# Patient Record
Sex: MALE | Race: WHITE | NOT HISPANIC OR LATINO | Employment: OTHER | URBAN - METROPOLITAN AREA
[De-identification: names, ages, dates, MRNs, and addresses within clinical notes are randomized per-mention and may not be internally consistent; named-entity substitution may affect disease eponyms.]

---

## 2017-09-05 PROBLEM — R91.1 PULMONARY NODULE: Status: ACTIVE | Noted: 2017-09-05

## 2017-09-05 PROBLEM — M31.6 TEMPORAL ARTERITIS (HCC): Status: ACTIVE | Noted: 2017-09-05

## 2018-08-15 PROBLEM — N45.1 EPIDIDYMITIS: Status: ACTIVE | Noted: 2018-08-15

## 2018-09-05 PROBLEM — S22.050D CLOSED WEDGE COMPRESSION FRACTURE OF FIFTH THORACIC VERTEBRA WITH ROUTINE HEALING: Status: ACTIVE | Noted: 2018-09-05

## 2019-05-20 PROBLEM — J04.0 LARYNGITIS: Status: ACTIVE | Noted: 2019-05-20

## 2019-07-11 PROBLEM — E11.9 DIABETES MELLITUS WITHOUT COMPLICATION (HCC): Status: ACTIVE | Noted: 2019-07-11

## 2021-03-11 PROBLEM — M81.0 OP (OSTEOPOROSIS): Status: ACTIVE | Noted: 2021-03-11

## 2023-07-08 ENCOUNTER — HOSPITAL ENCOUNTER (INPATIENT)
Facility: MEDICAL CENTER | Age: 88
LOS: 2 days | DRG: 153 | End: 2023-07-10
Attending: EMERGENCY MEDICINE | Admitting: STUDENT IN AN ORGANIZED HEALTH CARE EDUCATION/TRAINING PROGRAM
Payer: MEDICARE

## 2023-07-08 DIAGNOSIS — J04.0 LARYNGITIS: ICD-10-CM

## 2023-07-08 DIAGNOSIS — J38.7 VALLECULAR MASS: ICD-10-CM

## 2023-07-08 DIAGNOSIS — R13.10 DYSPHAGIA, UNSPECIFIED TYPE: ICD-10-CM

## 2023-07-08 DIAGNOSIS — I48.91 ATRIAL FIBRILLATION WITH RAPID VENTRICULAR RESPONSE (HCC): ICD-10-CM

## 2023-07-08 LAB
ALBUMIN SERPL BCP-MCNC: 4.1 G/DL (ref 3.2–4.9)
ALBUMIN/GLOB SERPL: 1.4 G/DL
ALP SERPL-CCNC: 65 U/L (ref 30–99)
ALT SERPL-CCNC: 10 U/L (ref 2–50)
ANION GAP SERPL CALC-SCNC: 17 MMOL/L (ref 7–16)
AST SERPL-CCNC: 9 U/L (ref 12–45)
BASOPHILS # BLD AUTO: 0.2 % (ref 0–1.8)
BASOPHILS # BLD: 0.02 K/UL (ref 0–0.12)
BILIRUB SERPL-MCNC: 1.2 MG/DL (ref 0.1–1.5)
BUN SERPL-MCNC: 24 MG/DL (ref 8–22)
CALCIUM ALBUM COR SERPL-MCNC: 9 MG/DL (ref 8.5–10.5)
CALCIUM SERPL-MCNC: 9.1 MG/DL (ref 8.5–10.5)
CHLORIDE SERPL-SCNC: 101 MMOL/L (ref 96–112)
CO2 SERPL-SCNC: 20 MMOL/L (ref 20–33)
CREAT SERPL-MCNC: 0.88 MG/DL (ref 0.5–1.4)
EKG IMPRESSION: NORMAL
EOSINOPHIL # BLD AUTO: 0 K/UL (ref 0–0.51)
EOSINOPHIL NFR BLD: 0 % (ref 0–6.9)
ERYTHROCYTE [DISTWIDTH] IN BLOOD BY AUTOMATED COUNT: 46.4 FL (ref 35.9–50)
GFR SERPLBLD CREATININE-BSD FMLA CKD-EPI: 83 ML/MIN/1.73 M 2
GLOBULIN SER CALC-MCNC: 2.9 G/DL (ref 1.9–3.5)
GLUCOSE SERPL-MCNC: 140 MG/DL (ref 65–99)
HCT VFR BLD AUTO: 46.8 % (ref 42–52)
HGB BLD-MCNC: 14.9 G/DL (ref 14–18)
IMM GRANULOCYTES # BLD AUTO: 0.04 K/UL (ref 0–0.11)
IMM GRANULOCYTES NFR BLD AUTO: 0.5 % (ref 0–0.9)
LYMPHOCYTES # BLD AUTO: 0.84 K/UL (ref 1–4.8)
LYMPHOCYTES NFR BLD: 10.1 % (ref 22–41)
MCH RBC QN AUTO: 29 PG (ref 27–33)
MCHC RBC AUTO-ENTMCNC: 31.8 G/DL (ref 32.3–36.5)
MCV RBC AUTO: 91.1 FL (ref 81.4–97.8)
MONOCYTES # BLD AUTO: 0.09 K/UL (ref 0–0.85)
MONOCYTES NFR BLD AUTO: 1.1 % (ref 0–13.4)
NEUTROPHILS # BLD AUTO: 7.36 K/UL (ref 1.82–7.42)
NEUTROPHILS NFR BLD: 88.1 % (ref 44–72)
NRBC # BLD AUTO: 0 K/UL
NRBC BLD-RTO: 0 /100 WBC (ref 0–0.2)
PLATELET # BLD AUTO: 229 K/UL (ref 164–446)
PMV BLD AUTO: 9.5 FL (ref 9–12.9)
POTASSIUM SERPL-SCNC: 4.2 MMOL/L (ref 3.6–5.5)
PROT SERPL-MCNC: 7 G/DL (ref 6–8.2)
RBC # BLD AUTO: 5.14 M/UL (ref 4.7–6.1)
SODIUM SERPL-SCNC: 138 MMOL/L (ref 135–145)
TROPONIN T SERPL-MCNC: 12 NG/L (ref 6–19)
WBC # BLD AUTO: 8.4 K/UL (ref 4.8–10.8)

## 2023-07-08 PROCEDURE — 99222 1ST HOSP IP/OBS MODERATE 55: CPT | Mod: 25,AI | Performed by: STUDENT IN AN ORGANIZED HEALTH CARE EDUCATION/TRAINING PROGRAM

## 2023-07-08 PROCEDURE — 99285 EMERGENCY DEPT VISIT HI MDM: CPT

## 2023-07-08 PROCEDURE — 85025 COMPLETE CBC W/AUTO DIFF WBC: CPT

## 2023-07-08 PROCEDURE — 770020 HCHG ROOM/CARE - TELE (206)

## 2023-07-08 PROCEDURE — 96374 THER/PROPH/DIAG INJ IV PUSH: CPT

## 2023-07-08 PROCEDURE — 93005 ELECTROCARDIOGRAM TRACING: CPT | Performed by: EMERGENCY MEDICINE

## 2023-07-08 PROCEDURE — 700111 HCHG RX REV CODE 636 W/ 250 OVERRIDE (IP): Mod: JZ | Performed by: EMERGENCY MEDICINE

## 2023-07-08 PROCEDURE — 84484 ASSAY OF TROPONIN QUANT: CPT

## 2023-07-08 PROCEDURE — 94760 N-INVAS EAR/PLS OXIMETRY 1: CPT

## 2023-07-08 PROCEDURE — 99497 ADVNCD CARE PLAN 30 MIN: CPT | Performed by: STUDENT IN AN ORGANIZED HEALTH CARE EDUCATION/TRAINING PROGRAM

## 2023-07-08 PROCEDURE — 36415 COLL VENOUS BLD VENIPUNCTURE: CPT

## 2023-07-08 PROCEDURE — 80053 COMPREHEN METABOLIC PANEL: CPT

## 2023-07-08 PROCEDURE — 700105 HCHG RX REV CODE 258: Mod: JZ | Performed by: EMERGENCY MEDICINE

## 2023-07-08 RX ORDER — METOPROLOL TARTRATE 1 MG/ML
5 INJECTION, SOLUTION INTRAVENOUS
Status: DISCONTINUED | OUTPATIENT
Start: 2023-07-08 | End: 2023-07-10 | Stop reason: HOSPADM

## 2023-07-08 RX ORDER — ONDANSETRON 2 MG/ML
4 INJECTION INTRAMUSCULAR; INTRAVENOUS EVERY 4 HOURS PRN
Status: DISCONTINUED | OUTPATIENT
Start: 2023-07-08 | End: 2023-07-10 | Stop reason: HOSPADM

## 2023-07-08 RX ORDER — LABETALOL HYDROCHLORIDE 5 MG/ML
10 INJECTION, SOLUTION INTRAVENOUS EVERY 4 HOURS PRN
Status: DISCONTINUED | OUTPATIENT
Start: 2023-07-08 | End: 2023-07-10 | Stop reason: HOSPADM

## 2023-07-08 RX ORDER — POLYETHYLENE GLYCOL 3350 17 G/17G
1 POWDER, FOR SOLUTION ORAL
Status: DISCONTINUED | OUTPATIENT
Start: 2023-07-08 | End: 2023-07-10 | Stop reason: HOSPADM

## 2023-07-08 RX ORDER — ACETAMINOPHEN 325 MG/1
650 TABLET ORAL EVERY 6 HOURS PRN
Status: DISCONTINUED | OUTPATIENT
Start: 2023-07-08 | End: 2023-07-09

## 2023-07-08 RX ORDER — ATORVASTATIN CALCIUM 10 MG/1
10 TABLET, FILM COATED ORAL DAILY
Status: DISCONTINUED | OUTPATIENT
Start: 2023-07-08 | End: 2023-07-10 | Stop reason: HOSPADM

## 2023-07-08 RX ORDER — TRAMADOL HYDROCHLORIDE 50 MG/1
100 TABLET ORAL 2 TIMES DAILY
Status: DISCONTINUED | OUTPATIENT
Start: 2023-07-08 | End: 2023-07-09

## 2023-07-08 RX ORDER — BISACODYL 10 MG
10 SUPPOSITORY, RECTAL RECTAL
Status: DISCONTINUED | OUTPATIENT
Start: 2023-07-08 | End: 2023-07-10 | Stop reason: HOSPADM

## 2023-07-08 RX ORDER — DILTIAZEM HYDROCHLORIDE 5 MG/ML
10 INJECTION INTRAVENOUS ONCE
Status: COMPLETED | OUTPATIENT
Start: 2023-07-08 | End: 2023-07-08

## 2023-07-08 RX ORDER — AMOXICILLIN 250 MG
2 CAPSULE ORAL 2 TIMES DAILY
Status: DISCONTINUED | OUTPATIENT
Start: 2023-07-08 | End: 2023-07-10 | Stop reason: HOSPADM

## 2023-07-08 RX ORDER — ONDANSETRON 4 MG/1
4 TABLET, ORALLY DISINTEGRATING ORAL EVERY 4 HOURS PRN
Status: DISCONTINUED | OUTPATIENT
Start: 2023-07-08 | End: 2023-07-10 | Stop reason: HOSPADM

## 2023-07-08 RX ORDER — GUAIFENESIN/DEXTROMETHORPHAN 100-10MG/5
10 SYRUP ORAL EVERY 6 HOURS PRN
Status: DISCONTINUED | OUTPATIENT
Start: 2023-07-08 | End: 2023-07-10 | Stop reason: HOSPADM

## 2023-07-08 RX ORDER — TAMSULOSIN HYDROCHLORIDE 0.4 MG/1
0.4 CAPSULE ORAL DAILY
Status: DISCONTINUED | OUTPATIENT
Start: 2023-07-09 | End: 2023-07-10 | Stop reason: HOSPADM

## 2023-07-08 RX ORDER — SODIUM CHLORIDE 9 MG/ML
INJECTION, SOLUTION INTRAVENOUS CONTINUOUS
Status: DISCONTINUED | OUTPATIENT
Start: 2023-07-08 | End: 2023-07-10

## 2023-07-08 RX ORDER — MAGNESIUM SULFATE HEPTAHYDRATE 40 MG/ML
2 INJECTION, SOLUTION INTRAVENOUS ONCE
Status: COMPLETED | OUTPATIENT
Start: 2023-07-08 | End: 2023-07-09

## 2023-07-08 RX ORDER — SODIUM CHLORIDE, SODIUM LACTATE, POTASSIUM CHLORIDE, AND CALCIUM CHLORIDE .6; .31; .03; .02 G/100ML; G/100ML; G/100ML; G/100ML
500 INJECTION, SOLUTION INTRAVENOUS
Status: DISCONTINUED | OUTPATIENT
Start: 2023-07-08 | End: 2023-07-10 | Stop reason: HOSPADM

## 2023-07-08 RX ORDER — ASPIRIN 325 MG
325 TABLET ORAL EVERY 6 HOURS PRN
Status: ON HOLD | COMMUNITY
End: 2023-07-10

## 2023-07-08 RX ORDER — CELECOXIB 200 MG/1
200 CAPSULE ORAL DAILY
COMMUNITY
End: 2023-10-05

## 2023-07-08 RX ORDER — TRAMADOL HYDROCHLORIDE 50 MG/1
100 TABLET ORAL 2 TIMES DAILY
COMMUNITY
End: 2023-10-05 | Stop reason: SDUPTHER

## 2023-07-08 RX ORDER — SODIUM CHLORIDE, SODIUM LACTATE, POTASSIUM CHLORIDE, CALCIUM CHLORIDE 600; 310; 30; 20 MG/100ML; MG/100ML; MG/100ML; MG/100ML
1000 INJECTION, SOLUTION INTRAVENOUS ONCE
Status: COMPLETED | OUTPATIENT
Start: 2023-07-08 | End: 2023-07-08

## 2023-07-08 RX ORDER — TAMSULOSIN HYDROCHLORIDE 0.4 MG/1
0.4 CAPSULE ORAL DAILY
COMMUNITY
End: 2023-07-18

## 2023-07-08 RX ADMIN — DILTIAZEM HYDROCHLORIDE 10 MG: 5 INJECTION INTRAVENOUS at 21:08

## 2023-07-08 RX ADMIN — SODIUM CHLORIDE, POTASSIUM CHLORIDE, SODIUM LACTATE AND CALCIUM CHLORIDE 1000 ML: 600; 310; 30; 20 INJECTION, SOLUTION INTRAVENOUS at 21:13

## 2023-07-08 ASSESSMENT — CHA2DS2 SCORE
HYPERTENSION: YES
SEX: MALE
AGE 65 TO 74: NO
VASCULAR DISEASE: NO
PRIOR STROKE OR TIA OR THROMBOEMBOLISM: NO
CHA2DS2 VASC SCORE: 4
AGE 75 OR GREATER: YES
CHF OR LEFT VENTRICULAR DYSFUNCTION: NO
DIABETES: YES

## 2023-07-08 ASSESSMENT — FIBROSIS 4 INDEX: FIB4 SCORE: 1.45

## 2023-07-08 NOTE — ED TRIAGE NOTES
"Chief Complaint   Patient presents with    Other     Patient sent [facility] for sore throat and feeling like he has something stuck in throat. Patient voice is \"more gravel\" sounding over the day. The patient reports pain and feeling like something is stuck x 4 days. Patient was also dx with afib yesterday and started on medications but has been unable to take them due to unable to swallow.        "

## 2023-07-09 ENCOUNTER — HOSPITAL ENCOUNTER (OUTPATIENT)
Dept: RADIOLOGY | Facility: MEDICAL CENTER | Age: 88
End: 2023-07-09

## 2023-07-09 ENCOUNTER — APPOINTMENT (OUTPATIENT)
Dept: RADIOLOGY | Facility: MEDICAL CENTER | Age: 88
DRG: 153 | End: 2023-07-09
Attending: STUDENT IN AN ORGANIZED HEALTH CARE EDUCATION/TRAINING PROGRAM
Payer: MEDICARE

## 2023-07-09 PROBLEM — E86.0 DEHYDRATION: Status: ACTIVE | Noted: 2023-07-09

## 2023-07-09 PROBLEM — I48.91 ATRIAL FIBRILLATION WITH RVR (HCC): Status: ACTIVE | Noted: 2023-07-09

## 2023-07-09 PROBLEM — R73.9 HYPERGLYCEMIA: Status: ACTIVE | Noted: 2023-07-09

## 2023-07-09 LAB
ALBUMIN SERPL BCP-MCNC: 3.6 G/DL (ref 3.2–4.9)
ALBUMIN/GLOB SERPL: 1.2 G/DL
ALP SERPL-CCNC: 56 U/L (ref 30–99)
ALT SERPL-CCNC: 14 U/L (ref 2–50)
ANION GAP SERPL CALC-SCNC: 17 MMOL/L (ref 7–16)
APPEARANCE UR: CLEAR
AST SERPL-CCNC: 9 U/L (ref 12–45)
BASOPHILS # BLD AUTO: 0.2 % (ref 0–1.8)
BASOPHILS # BLD: 0.02 K/UL (ref 0–0.12)
BILIRUB SERPL-MCNC: 1.1 MG/DL (ref 0.1–1.5)
BILIRUB UR QL STRIP.AUTO: NEGATIVE
BUN SERPL-MCNC: 28 MG/DL (ref 8–22)
CALCIUM ALBUM COR SERPL-MCNC: 8.9 MG/DL (ref 8.5–10.5)
CALCIUM SERPL-MCNC: 8.6 MG/DL (ref 8.5–10.5)
CHLORIDE SERPL-SCNC: 104 MMOL/L (ref 96–112)
CO2 SERPL-SCNC: 20 MMOL/L (ref 20–33)
COLOR UR: YELLOW
CORTIS SERPL-MCNC: 2.6 UG/DL (ref 0–23)
CREAT SERPL-MCNC: 0.89 MG/DL (ref 0.5–1.4)
EKG IMPRESSION: NORMAL
EOSINOPHIL # BLD AUTO: 0 K/UL (ref 0–0.51)
EOSINOPHIL NFR BLD: 0 % (ref 0–6.9)
ERYTHROCYTE [DISTWIDTH] IN BLOOD BY AUTOMATED COUNT: 45 FL (ref 35.9–50)
EST. AVERAGE GLUCOSE BLD GHB EST-MCNC: 137 MG/DL
GFR SERPLBLD CREATININE-BSD FMLA CKD-EPI: 82 ML/MIN/1.73 M 2
GLOBULIN SER CALC-MCNC: 3 G/DL (ref 1.9–3.5)
GLUCOSE BLD STRIP.AUTO-MCNC: 153 MG/DL (ref 65–99)
GLUCOSE BLD STRIP.AUTO-MCNC: 163 MG/DL (ref 65–99)
GLUCOSE BLD STRIP.AUTO-MCNC: 177 MG/DL (ref 65–99)
GLUCOSE BLD STRIP.AUTO-MCNC: 209 MG/DL (ref 65–99)
GLUCOSE SERPL-MCNC: 136 MG/DL (ref 65–99)
GLUCOSE UR STRIP.AUTO-MCNC: NEGATIVE MG/DL
HBA1C MFR BLD: 6.4 % (ref 4–5.6)
HCT VFR BLD AUTO: 43.2 % (ref 42–52)
HGB BLD-MCNC: 14.3 G/DL (ref 14–18)
IMM GRANULOCYTES # BLD AUTO: 0.03 K/UL (ref 0–0.11)
IMM GRANULOCYTES NFR BLD AUTO: 0.3 % (ref 0–0.9)
KETONES UR STRIP.AUTO-MCNC: 15 MG/DL
LEUKOCYTE ESTERASE UR QL STRIP.AUTO: NEGATIVE
LYMPHOCYTES # BLD AUTO: 1.34 K/UL (ref 1–4.8)
LYMPHOCYTES NFR BLD: 15.4 % (ref 22–41)
MAGNESIUM SERPL-MCNC: 2.6 MG/DL (ref 1.5–2.5)
MCH RBC QN AUTO: 29.9 PG (ref 27–33)
MCHC RBC AUTO-ENTMCNC: 33.1 G/DL (ref 32.3–36.5)
MCV RBC AUTO: 90.2 FL (ref 81.4–97.8)
MICRO URNS: ABNORMAL
MONOCYTES # BLD AUTO: 0.16 K/UL (ref 0–0.85)
MONOCYTES NFR BLD AUTO: 1.8 % (ref 0–13.4)
NEUTROPHILS # BLD AUTO: 7.15 K/UL (ref 1.82–7.42)
NEUTROPHILS NFR BLD: 82.3 % (ref 44–72)
NITRITE UR QL STRIP.AUTO: NEGATIVE
NRBC # BLD AUTO: 0 K/UL
NRBC BLD-RTO: 0 /100 WBC (ref 0–0.2)
PH UR STRIP.AUTO: 5 [PH] (ref 5–8)
PHOSPHATE SERPL-MCNC: 4 MG/DL (ref 2.5–4.5)
PLATELET # BLD AUTO: 216 K/UL (ref 164–446)
PMV BLD AUTO: 9.6 FL (ref 9–12.9)
POTASSIUM SERPL-SCNC: 4.3 MMOL/L (ref 3.6–5.5)
PROCALCITONIN SERPL-MCNC: <0.05 NG/ML
PROT SERPL-MCNC: 6.6 G/DL (ref 6–8.2)
PROT UR QL STRIP: NEGATIVE MG/DL
RBC # BLD AUTO: 4.79 M/UL (ref 4.7–6.1)
RBC UR QL AUTO: NEGATIVE
SODIUM SERPL-SCNC: 141 MMOL/L (ref 135–145)
SP GR UR STRIP.AUTO: 1.04
T4 FREE SERPL-MCNC: 1.64 NG/DL (ref 0.93–1.7)
TROPONIN T SERPL-MCNC: 13 NG/L (ref 6–19)
TSH SERPL DL<=0.005 MIU/L-ACNC: 0.42 UIU/ML (ref 0.38–5.33)
UROBILINOGEN UR STRIP.AUTO-MCNC: 0.2 MG/DL
WBC # BLD AUTO: 8.7 K/UL (ref 4.8–10.8)

## 2023-07-09 PROCEDURE — 770020 HCHG ROOM/CARE - TELE (206)

## 2023-07-09 PROCEDURE — A9270 NON-COVERED ITEM OR SERVICE: HCPCS | Performed by: OTOLARYNGOLOGY

## 2023-07-09 PROCEDURE — 84145 PROCALCITONIN (PCT): CPT

## 2023-07-09 PROCEDURE — 81003 URINALYSIS AUTO W/O SCOPE: CPT

## 2023-07-09 PROCEDURE — 700102 HCHG RX REV CODE 250 W/ 637 OVERRIDE(OP)

## 2023-07-09 PROCEDURE — A9270 NON-COVERED ITEM OR SERVICE: HCPCS | Performed by: STUDENT IN AN ORGANIZED HEALTH CARE EDUCATION/TRAINING PROGRAM

## 2023-07-09 PROCEDURE — 36415 COLL VENOUS BLD VENIPUNCTURE: CPT

## 2023-07-09 PROCEDURE — 700111 HCHG RX REV CODE 636 W/ 250 OVERRIDE (IP): Mod: JZ | Performed by: STUDENT IN AN ORGANIZED HEALTH CARE EDUCATION/TRAINING PROGRAM

## 2023-07-09 PROCEDURE — 99232 SBSQ HOSP IP/OBS MODERATE 35: CPT | Performed by: STUDENT IN AN ORGANIZED HEALTH CARE EDUCATION/TRAINING PROGRAM

## 2023-07-09 PROCEDURE — 87086 URINE CULTURE/COLONY COUNT: CPT

## 2023-07-09 PROCEDURE — 83036 HEMOGLOBIN GLYCOSYLATED A1C: CPT

## 2023-07-09 PROCEDURE — 92611 MOTION FLUOROSCOPY/SWALLOW: CPT

## 2023-07-09 PROCEDURE — 84100 ASSAY OF PHOSPHORUS: CPT

## 2023-07-09 PROCEDURE — A9270 NON-COVERED ITEM OR SERVICE: HCPCS

## 2023-07-09 PROCEDURE — 700105 HCHG RX REV CODE 258: Performed by: STUDENT IN AN ORGANIZED HEALTH CARE EDUCATION/TRAINING PROGRAM

## 2023-07-09 PROCEDURE — 74230 X-RAY XM SWLNG FUNCJ C+: CPT

## 2023-07-09 PROCEDURE — 93010 ELECTROCARDIOGRAM REPORT: CPT | Performed by: STUDENT IN AN ORGANIZED HEALTH CARE EDUCATION/TRAINING PROGRAM

## 2023-07-09 PROCEDURE — 84443 ASSAY THYROID STIM HORMONE: CPT

## 2023-07-09 PROCEDURE — 84484 ASSAY OF TROPONIN QUANT: CPT

## 2023-07-09 PROCEDURE — 93005 ELECTROCARDIOGRAM TRACING: CPT | Performed by: STUDENT IN AN ORGANIZED HEALTH CARE EDUCATION/TRAINING PROGRAM

## 2023-07-09 PROCEDURE — 80053 COMPREHEN METABOLIC PANEL: CPT

## 2023-07-09 PROCEDURE — 700102 HCHG RX REV CODE 250 W/ 637 OVERRIDE(OP): Performed by: STUDENT IN AN ORGANIZED HEALTH CARE EDUCATION/TRAINING PROGRAM

## 2023-07-09 PROCEDURE — 84439 ASSAY OF FREE THYROXINE: CPT

## 2023-07-09 PROCEDURE — 83735 ASSAY OF MAGNESIUM: CPT

## 2023-07-09 PROCEDURE — 82962 GLUCOSE BLOOD TEST: CPT | Mod: 91

## 2023-07-09 PROCEDURE — 700102 HCHG RX REV CODE 250 W/ 637 OVERRIDE(OP): Performed by: OTOLARYNGOLOGY

## 2023-07-09 PROCEDURE — 0CJS8ZZ INSPECTION OF LARYNX, VIA NATURAL OR ARTIFICIAL OPENING ENDOSCOPIC: ICD-10-PCS | Performed by: OTOLARYNGOLOGY

## 2023-07-09 PROCEDURE — 700117 HCHG RX CONTRAST REV CODE 255: Performed by: STUDENT IN AN ORGANIZED HEALTH CARE EDUCATION/TRAINING PROGRAM

## 2023-07-09 PROCEDURE — 92610 EVALUATE SWALLOWING FUNCTION: CPT

## 2023-07-09 PROCEDURE — 82533 TOTAL CORTISOL: CPT

## 2023-07-09 PROCEDURE — 85025 COMPLETE CBC W/AUTO DIFF WBC: CPT

## 2023-07-09 RX ORDER — OXYMETAZOLINE HYDROCHLORIDE 0.05 G/100ML
2 SPRAY NASAL ONCE
Status: COMPLETED | OUTPATIENT
Start: 2023-07-09 | End: 2023-07-09

## 2023-07-09 RX ORDER — AMLODIPINE BESYLATE 10 MG/1
10 TABLET ORAL DAILY
Status: DISCONTINUED | OUTPATIENT
Start: 2023-07-09 | End: 2023-07-10 | Stop reason: HOSPADM

## 2023-07-09 RX ORDER — TRAMADOL HYDROCHLORIDE 50 MG/1
50 TABLET ORAL 2 TIMES DAILY
Status: DISCONTINUED | OUTPATIENT
Start: 2023-07-09 | End: 2023-07-10 | Stop reason: HOSPADM

## 2023-07-09 RX ORDER — ACETAMINOPHEN 500 MG
1000 TABLET ORAL 3 TIMES DAILY
Status: DISCONTINUED | OUTPATIENT
Start: 2023-07-09 | End: 2023-07-10 | Stop reason: HOSPADM

## 2023-07-09 RX ORDER — DEXAMETHASONE SODIUM PHOSPHATE 4 MG/ML
4 INJECTION, SOLUTION INTRA-ARTICULAR; INTRALESIONAL; INTRAMUSCULAR; INTRAVENOUS; SOFT TISSUE EVERY 8 HOURS
Status: DISCONTINUED | OUTPATIENT
Start: 2023-07-09 | End: 2023-07-10 | Stop reason: HOSPADM

## 2023-07-09 RX ORDER — KETOROLAC TROMETHAMINE 30 MG/ML
15 INJECTION, SOLUTION INTRAMUSCULAR; INTRAVENOUS EVERY 8 HOURS PRN
Status: DISCONTINUED | OUTPATIENT
Start: 2023-07-09 | End: 2023-07-10 | Stop reason: HOSPADM

## 2023-07-09 RX ORDER — LOSARTAN POTASSIUM 50 MG/1
100 TABLET ORAL DAILY
Status: DISCONTINUED | OUTPATIENT
Start: 2023-07-09 | End: 2023-07-10 | Stop reason: HOSPADM

## 2023-07-09 RX ORDER — LIDOCAINE HYDROCHLORIDE 20 MG/ML
15 SOLUTION OROPHARYNGEAL
Status: DISCONTINUED | OUTPATIENT
Start: 2023-07-09 | End: 2023-07-10 | Stop reason: HOSPADM

## 2023-07-09 RX ORDER — AMOXICILLIN AND CLAVULANATE POTASSIUM 875; 125 MG/1; MG/1
1 TABLET, FILM COATED ORAL EVERY 12 HOURS
Status: DISCONTINUED | OUTPATIENT
Start: 2023-07-09 | End: 2023-07-10 | Stop reason: HOSPADM

## 2023-07-09 RX ADMIN — AMOXICILLIN AND CLAVULANATE POTASSIUM 1 TABLET: 875; 125 TABLET, FILM COATED ORAL at 17:08

## 2023-07-09 RX ADMIN — ACETAMINOPHEN 1000 MG: 500 TABLET, FILM COATED ORAL at 17:07

## 2023-07-09 RX ADMIN — METOPROLOL TARTRATE 12.5 MG: 25 TABLET, FILM COATED ORAL at 17:08

## 2023-07-09 RX ADMIN — SODIUM CHLORIDE: 9 INJECTION, SOLUTION INTRAVENOUS at 01:04

## 2023-07-09 RX ADMIN — ACETAMINOPHEN 1000 MG: 500 TABLET, FILM COATED ORAL at 11:53

## 2023-07-09 RX ADMIN — TAMSULOSIN HYDROCHLORIDE 0.4 MG: 0.4 CAPSULE ORAL at 05:24

## 2023-07-09 RX ADMIN — INSULIN HUMAN 1 UNITS: 100 INJECTION, SOLUTION PARENTERAL at 19:52

## 2023-07-09 RX ADMIN — INSULIN HUMAN 1 UNITS: 100 INJECTION, SOLUTION PARENTERAL at 16:53

## 2023-07-09 RX ADMIN — MAGNESIUM SULFATE HEPTAHYDRATE 2 G: 2 INJECTION, SOLUTION INTRAVENOUS at 01:04

## 2023-07-09 RX ADMIN — INSULIN HUMAN 1 UNITS: 100 INJECTION, SOLUTION PARENTERAL at 07:38

## 2023-07-09 RX ADMIN — DEXAMETHASONE SODIUM PHOSPHATE 4 MG: 4 INJECTION, SOLUTION INTRAMUSCULAR; INTRAVENOUS at 01:33

## 2023-07-09 RX ADMIN — TRAMADOL HYDROCHLORIDE 50 MG: 50 TABLET ORAL at 17:09

## 2023-07-09 RX ADMIN — TRAMADOL HYDROCHLORIDE 50 MG: 50 TABLET ORAL at 06:24

## 2023-07-09 RX ADMIN — AMOXICILLIN AND CLAVULANATE POTASSIUM 1 TABLET: 875; 125 TABLET, FILM COATED ORAL at 11:53

## 2023-07-09 RX ADMIN — TRAMADOL HYDROCHLORIDE 50 MG: 50 TABLET, COATED ORAL at 00:14

## 2023-07-09 RX ADMIN — BARIUM SULFATE 700 MG: 700 TABLET ORAL at 10:55

## 2023-07-09 RX ADMIN — DEXAMETHASONE SODIUM PHOSPHATE 4 MG: 4 INJECTION, SOLUTION INTRAMUSCULAR; INTRAVENOUS at 10:32

## 2023-07-09 RX ADMIN — METOPROLOL TARTRATE 12.5 MG: 25 TABLET, FILM COATED ORAL at 00:13

## 2023-07-09 RX ADMIN — METOPROLOL TARTRATE 12.5 MG: 25 TABLET, FILM COATED ORAL at 05:24

## 2023-07-09 RX ADMIN — ATORVASTATIN CALCIUM 10 MG: 10 TABLET, FILM COATED ORAL at 17:08

## 2023-07-09 RX ADMIN — DEXAMETHASONE SODIUM PHOSPHATE 4 MG: 4 INJECTION, SOLUTION INTRAMUSCULAR; INTRAVENOUS at 17:05

## 2023-07-09 RX ADMIN — AMLODIPINE BESYLATE 10 MG: 10 TABLET ORAL at 17:09

## 2023-07-09 RX ADMIN — OXYMETAZOLINE HCL 2 SPRAY: 0.05 SPRAY NASAL at 09:45

## 2023-07-09 RX ADMIN — ATORVASTATIN CALCIUM 10 MG: 10 TABLET, FILM COATED ORAL at 01:08

## 2023-07-09 RX ADMIN — INSULIN HUMAN 2 UNITS: 100 INJECTION, SOLUTION PARENTERAL at 11:56

## 2023-07-09 RX ADMIN — APIXABAN 5 MG: 5 TABLET, FILM COATED ORAL at 17:08

## 2023-07-09 ASSESSMENT — PAIN DESCRIPTION - PAIN TYPE
TYPE: ACUTE PAIN

## 2023-07-09 ASSESSMENT — PATIENT HEALTH QUESTIONNAIRE - PHQ9
2. FEELING DOWN, DEPRESSED, IRRITABLE, OR HOPELESS: NOT AT ALL
SUM OF ALL RESPONSES TO PHQ9 QUESTIONS 1 AND 2: 0
1. LITTLE INTEREST OR PLEASURE IN DOING THINGS: NOT AT ALL

## 2023-07-09 ASSESSMENT — COGNITIVE AND FUNCTIONAL STATUS - GENERAL
SUGGESTED CMS G CODE MODIFIER DAILY ACTIVITY: CH
STANDING UP FROM CHAIR USING ARMS: A LITTLE
DAILY ACTIVITIY SCORE: 24

## 2023-07-09 ASSESSMENT — LIFESTYLE VARIABLES
TOTAL SCORE: 0
SUBSTANCE_ABUSE: 0
HAVE PEOPLE ANNOYED YOU BY CRITICIZING YOUR DRINKING: NO
EVER HAD A DRINK FIRST THING IN THE MORNING TO STEADY YOUR NERVES TO GET RID OF A HANGOVER: NO
HAVE YOU EVER FELT YOU SHOULD CUT DOWN ON YOUR DRINKING: NO
AVERAGE NUMBER OF DAYS PER WEEK YOU HAVE A DRINK CONTAINING ALCOHOL: 0
HOW MANY TIMES IN THE PAST YEAR HAVE YOU HAD 5 OR MORE DRINKS IN A DAY: 0
DOES PATIENT WANT TO STOP DRINKING: CANNOT ASSESS
ON A TYPICAL DAY WHEN YOU DRINK ALCOHOL HOW MANY DRINKS DO YOU HAVE: 0
ALCOHOL_USE: NO
CONSUMPTION TOTAL: NEGATIVE
EVER FELT BAD OR GUILTY ABOUT YOUR DRINKING: NO
TOTAL SCORE: 0
TOTAL SCORE: 0

## 2023-07-09 ASSESSMENT — ENCOUNTER SYMPTOMS
DIZZINESS: 0
FEVER: 0
DEPRESSION: 0
ROS GI COMMENTS: DYSPHAGIA
MYALGIAS: 0
BRUISES/BLEEDS EASILY: 0
PALPITATIONS: 1
ABDOMINAL PAIN: 0
HEARTBURN: 1
WEAKNESS: 1
NAUSEA: 1
HEADACHES: 0
SORE THROAT: 1
DOUBLE VISION: 0
CHILLS: 0
VOMITING: 0
COUGH: 0
BLURRED VISION: 0
SHORTNESS OF BREATH: 0

## 2023-07-09 ASSESSMENT — FIBROSIS 4 INDEX
FIB4 SCORE: 1.09
FIB4 SCORE: 1.09

## 2023-07-09 NOTE — THERAPY
"   Speech Language Pathology   Videofluoroscopic Swallow Study Evaluation     Patient Name: Abhilash Garcia  AGE:  88 y.o., SEX:  male  Medical Record #: [id number]  Date of Service: 7/9/2023      History of Present Illness  88 y.o. male who presents to the ED complaining of throat pain onset four days ago. He locates the pain to the right side of his throat.  PMHx: atrial fibrillation on Eliquis, ulcerative colitis, hypertension, hyperlipidemia      He had multiple CT chest and CT soft tissues neck --which did not show any obstruction or leak.  ENT was consulted by ERP, formal evaluation to follow in AM.      CT of neck 7/8/23:  There is opacification of the vallecula bilaterally, new compared to the prior exam.  This has the appearance of ingested material, it given the presence of mottled air.  Prominent palatine tonsils.  Right mastoid effusion.        Flexible fiberoptic laryngoscopy 7/9/23:  \"CT with inflammatory changes in palatine tonsils, lingual tonsils, epiglottis, uvula. Scope today confirms these findings with edema throughout. Airway widely patent. Some hoarseness and left vocal fold is sluggish vs right. No evidence of tumors or masses and no food impaction present.\"      Pertinent Information  Current Method of Nutrition: Oral diet (clear liquids)  Dentition: Good, Fair  Secretion Management: Adequate  Feeding Tube: None  Tracheostomy: No          Factor(s) Affecting Performance: None      Discussed the risks, benefits, and alternatives of the VFSS procedure. Patient/family acknowledged and agreed to proceed.      Assessment  Videofluoroscopic Swallow Study was conducted in the Lateral, A-P projection(s) to evaluate oropharyngeal swallow function. A radiology tech was present to assist with the procedure.      Positioning: Seated upright in fluoroscopy chair  Anatomic View: WFL  Bolus Administration: SLP, Patient  PO Barium Contrast Trials: Varibar thin, Liquidised mixed with Varibar powder, Varibar " pudding, Regular solid coated with Varibar pudding, Mixed consistency with Varibar powder, Soft & Bite sized coated with Varibar powder, Barium tablet      Consistency PAS Score Timing Residue Comments   Thin Liquid 6 During swallow Vallecular Residue: Mild (5%-25%)  Pyriform Sinus Residue: Trace (1%-5%)    Mildly Thick 1 N/A Vallecular Residue: Severe (>50%)  Pyriform Sinus Residue: Trace (1%-5%)    Pudding 1 N/A Vallecular Residue: Mild (5%-25%)  Pyriform Sinus Residue: Trace (1%-5%)    Soft & Bite Sized 1 N/A Vallecular Residue: Moderate (25%-50%)  Pyriform Sinus Residue: None (0%)    Regular Solid 1 N/A Vallecular Residue: Trace (1%-5%)  Pyriform Sinus Residue: None (0%)    Mixed 1 N/A Vallecular Residue: Mild (5%-25%)  Pyriform Sinus Residue: None (0%)      Penetration-Aspiration Scale (PAS)  1     No contrast enters airway  2     Contrast enters the airway, remains above the vocal folds, and is ejected from the airway (not seen in the airway at the end of the swallow).  3     Contrast enters the airway, remains above the vocal folds, and is not ejected from the airway (is seen in the airway after the swallow).  4     Contrast enters the airway, contacts the vocal folds, and is ejected from the airway.  5     Contrast enters the airway, contacts the vocal folds, and is not ejected from the airway  6     Contrast enters the airway, crosses the plane of the vocal folds, and is ejected from the airway.  7     Contrast enters the airway, crosses the plane of the vocal folds, and is not ejected from the airway despite effort.  8     Contrast enters the airway, crosses the plane of the vocal folds, is not ejected from the airway and there is no response to aspiration.       Oral phase: Piecemeal deglutition and mildly delayed onset of swallow trigger.       Pharyngeal phase: Reduced tongue base retraction; reduced hyolaryngeal excursion; reduced laryngeal elevation; and mistimed laryngeal vestibule closure resulted  in the following:  Mild-severe vallecular residue across all consistencies tested (MTL>applesauce>thins>solids).  Aspiration occurred during the swallow following consecutive sips of thins from the straw. A reflexive coughing response expectorated aspirates from airway.  Consistent, transient laryngeal penetration with thin liquids via cup.      Esophageal phase:  Administration of applesauce, barium tablet and MTL administered in A-P view revealed no retention of contrast. All boluses passed through esophagus in a timely fashion.       Compensatory Strategies:  A cued second swallow significantly reduced vallecular residue    Severity Rating:   PUJA: Mild      Clinical Impressions  The pt presents with a mild oropharyngeal dysphagia suspect acute r/t possible pharyngeal edema and sluggish L vocal cord motion noted on today's laryngoscopy. Pt with hx of CVA w/ left sided deficits (resolved); therefore, difficult to determine if lingual deviation is acute. Risk for aspiration and malnutrition is low with adherence to swallow precautions. Patient is a good candidate for exercise-based and behavioral therapy.       Recommendations  Diet Consistency: Soft & bite size and thin liquid diet  Medication: Whole with liquid, Whole with puree, As tolerated  Supervision: Distant supervision - check on patient 2-3 times per meal  Positioning: Fully upright and midline during oral intake  Strategies: Small bites/sips, Slow rate of intake, Multiple swallows (2) per bite/sips  Oral Care: BID  Additional Instrumentation: None  Consult Referral(s): ENT, neurology      SLP Treatment Plan  Treatment Plan: Dysphagia Treatment, Patient/Family/Caregiver Training  SLP Frequency: 3x Per Week  Estimated Duration: Until Therapy Goals Met      Anticipated Discharge Needs  Discharge Recommendations: Recommend home health for continued speech therapy services   Therapy Recommendations Upon DC: Dysphagia Training, Patient / Family / Caregiver  "Education        Patient / Family Goals  Patient / Family Goal #1: \"I want to figure out what is going on\"  Goal #1 Outcome: Progressing as expected  Short Term Goal # 1: Patient will participate in MBSS to further assess oropharyngeal function.  Goal Outcome # 1: Goal met, new goal added  Short Term Goal # 1 B : Patient will consume a SB6/TN0 diet with no overt s/sx of aspiration given min cues to swallow precautions.      Pauline Hein, SLP   "

## 2023-07-09 NOTE — ED PROVIDER NOTES
"  ER Provider Note    Scribed for Chip Arauz M.d. by Maria Eugenia Moya. 7/8/2023  8:25 PM    Primary Care Provider: Ike Alvarez M.D.    CHIEF COMPLAINT  Chief Complaint   Patient presents with    Other     Patient sent [facility] for sore throat and feeling like he has something stuck in throat. Patient voice is \"more gravel\" sounding over the day. The patient reports pain and feeling like something is stuck x 4 days. Patient was also dx with afib yesterday and started on medications but has been unable to take them due to unable to swallow.      EXTERNAL RECORDS REVIEWED  Other [place] notes in the ER    HPI/ROS  LIMITATION TO HISTORY   Select: : None  OUTSIDE HISTORIAN(S):  Family at bedside    Abhilash Garcia is a 88 y.o. male who presents to the ED complaining of throat pain onset four days ago. He locates the pain to the right side of his throat. The patient notes that the pain is exacerbated by eating. He notes that when he eats something it feels like it is obstructing his throat and \"sits there\" for several hours. He notes that he has not eaten in two days due to this. The patient was seen at [facility] for this yesterday and was diagnosed with Afib while he was there. He reports that he has cholitis and is receiving chemotherapy for management. The patient also has a history of hypertension and is taking blood pressure medication.    PAST MEDICAL HISTORY  Past Medical History:   Diagnosis Date    ASTHMA     Blood in stool     CATARACT     Hypertension     Maintenance chemotherapy following disease     Melanoma (HCC)     Pneumonia     Ulcer     ULCERATIVE COLITIS        SURGICAL HISTORY  Past Surgical History:   Procedure Laterality Date    BURSA EXCISION  2012    PROSTATECTOMY, RADICAL RETRO  2010    CATARACT EXTRACTION WITH IOL  2008    TRIGGER FINGER RELEASE  2006    left hand    TRIGGER FINGER RELEASE  2000    right hand    MOLE EXCISION  1995    LYMPHADENECTOMY  1983       FAMILY HISTORY  Family History " "  Problem Relation Age of Onset    Cancer Father         eye orbit    Cancer Mother         lung    Diabetes Sister        SOCIAL HISTORY   reports that he quit smoking about 62 years ago. His smoking use included cigarettes. He has a 18.00 pack-year smoking history. He has never used smokeless tobacco. He reports that he does not drink alcohol and does not use drugs.    CURRENT MEDICATIONS  Previous Medications    ACETAMINOPHEN (TYLENOL) 325 MG TAB    Take 650 mg by mouth every four hours as needed.    AMLODIPINE (NORVASC) 10 MG TAB    TAKE ONE TABLET BY MOUTH DAILY INDICATIONS: HIGH BLOOD PRESSURE    APIXABAN (ELIQUIS) 5MG TAB    Take 1 Tablet by mouth 2 times a day.    ATORVASTATIN (LIPITOR) 10 MG TAB    TAKE ONE TABLET BY MOUTH DAILY    CALCIUM CARBONATE-VITAMIN D 600-400 MG-UNIT TAB    Take  by mouth.    CHOLECALCIFEROL (VITAMIN D3) 2000 UNITS TAB    Take 6,000 Units by mouth every day.    FLUTICASONE (FLONASE) 50 MCG/ACT NASAL SPRAY    as needed.    LACTOBACILLUS (FLORANEX) TAB    Take 1 tablet by mouth 2 Times a Day.    LOSARTAN (COZAAR) 100 MG TAB    TAKE ONE TABLET BY MOUTH DAILY    MAGNESIUM OXIDE (MAG-OX) 400 MG TAB    Take 200 mg by mouth every day.    METOPROLOL TARTRATE (LOPRESSOR) 25 MG TAB    Take 0.5 Tablets by mouth 2 times a day.    MULTIPLE VITAMINS-MINERALS (ZINC PO)    Take  by mouth.    NON FORMULARY REQUEST        TADALAFIL (CIALIS) 10 MG TABLET    Take 1 Tablet by mouth 1 time a day as needed for Erectile Dysfunction.    TRAMADOL  MG TAB    Take 100 mg by mouth 2 times a day as needed (pain).       ALLERGIES  Patient has no known allergies.    PHYSICAL EXAM  /80   Pulse 80   Temp 36.9 °C (98.4 °F) (Temporal)   Resp 16   Ht 1.778 m (5' 10\")   Wt 75.3 kg (166 lb 0.1 oz)   SpO2 95%   BMI 23.82 kg/m²   Constitutional: Well developed, Well nourished, No acute distress, Non-toxic appearance.   HENT: Normocephalic, Atraumatic, mucous membranes dry, no erythema, exudates, " swelling, or masses, nares patent  Eyes: nonicteric  Neck: Supple, no meningismus  Lymphatic: No lymphadenopathy noted.   Cardiovascular: Irregularly irregular rhythm, Tachycardic, Afib on monitor, no gallops rubs or murmurs  Lungs: Clear bilaterally   Abdomen: Soft and nontender throughout  Skin: Warm, Dry, no rash  Genitalia: Deferred  Rectal: Deferred  Extremities: No edema  Neurologic: Alert, appropriate, follows commands, moving all extremities, normal speech   Psychiatric: Affect normal    DIAGNOSTIC STUDIES    Labs:   Results for orders placed or performed during the hospital encounter of 07/08/23   CBC WITH DIFFERENTIAL   Result Value Ref Range    WBC 8.4 4.8 - 10.8 K/uL    RBC 5.14 4.70 - 6.10 M/uL    Hemoglobin 14.9 14.0 - 18.0 g/dL    Hematocrit 46.8 42.0 - 52.0 %    MCV 91.1 81.4 - 97.8 fL    MCH 29.0 27.0 - 33.0 pg    MCHC 31.8 (L) 32.3 - 36.5 g/dL    RDW 46.4 35.9 - 50.0 fL    Platelet Count 229 164 - 446 K/uL    MPV 9.5 9.0 - 12.9 fL    Neutrophils-Polys 88.10 (H) 44.00 - 72.00 %    Lymphocytes 10.10 (L) 22.00 - 41.00 %    Monocytes 1.10 0.00 - 13.40 %    Eosinophils 0.00 0.00 - 6.90 %    Basophils 0.20 0.00 - 1.80 %    Immature Granulocytes 0.50 0.00 - 0.90 %    Nucleated RBC 0.00 0.00 - 0.20 /100 WBC    Neutrophils (Absolute) 7.36 1.82 - 7.42 K/uL    Lymphs (Absolute) 0.84 (L) 1.00 - 4.80 K/uL    Monos (Absolute) 0.09 0.00 - 0.85 K/uL    Eos (Absolute) 0.00 0.00 - 0.51 K/uL    Baso (Absolute) 0.02 0.00 - 0.12 K/uL    Immature Granulocytes (abs) 0.04 0.00 - 0.11 K/uL    NRBC (Absolute) 0.00 K/uL   COMP METABOLIC PANEL   Result Value Ref Range    Sodium 138 135 - 145 mmol/L    Potassium 4.2 3.6 - 5.5 mmol/L    Chloride 101 96 - 112 mmol/L    Co2 20 20 - 33 mmol/L    Anion Gap 17.0 (H) 7.0 - 16.0    Glucose 140 (H) 65 - 99 mg/dL    Bun 24 (H) 8 - 22 mg/dL    Creatinine 0.88 0.50 - 1.40 mg/dL    Calcium 9.1 8.5 - 10.5 mg/dL    AST(SGOT) 9 (L) 12 - 45 U/L    ALT(SGPT) 10 2 - 50 U/L    Alkaline  Phosphatase 65 30 - 99 U/L    Total Bilirubin 1.2 0.1 - 1.5 mg/dL    Albumin 4.1 3.2 - 4.9 g/dL    Total Protein 7.0 6.0 - 8.2 g/dL    Globulin 2.9 1.9 - 3.5 g/dL    A-G Ratio 1.4 g/dL   TROPONIN   Result Value Ref Range    Troponin T 12 6 - 19 ng/L   EKG (NOW)   Result Value Ref Range    Report       [facility] Emergency Dept.    Test Date:  2023  Pt Name:    KAITLYNN MARMOLEJO                Department: ER  MRN:        [id number]                      Room:       [facility]  Gender:     Male                         Technician: [id number]  :        [id number]                   Requested By:LILLIANA RODRIGUEZ  Order #:    [id number]                    Reading MD:    Measurements  Intervals                                Axis  Rate:       112                          P:          0  NY:         0                            QRS:        -76  QRSD:       111                          T:          57  QT:         356  QTc:        486    Interpretive Statements  Atrial fibrillation  Left anterior fascicular block  Abnormal R-wave progression, late transition  Borderline prolonged QT interval  No previous ECG available for comparison          EKG:   Obtained at 9:01 PM  AFIB with RVR   Rate 112  Axis normal   Intervals normal   No ST segment elevation or depression.   Left AFB, Prolonged QT interval     COURSE & MEDICAL DECISION MAKING     8:33 PM - Patient seen and examined at bedside. Discussed plan of care, including consult with ENT and reassessment. The patient verbalizes understanding with the plan of care. The patient will be medicated with Cardizem 10 mg and LR bolus. Ordered for Troponin, CBC w/ diff, CMP, and EKG to evaluate his symptoms. The patient was given an opportunity to ask questions.        ED Observation Status? No; Patient does not meet criteria for ED Observation.     8:37 PM Paged [facility] for Dr. Flores.      9:03 PM Spoke with Dr. Jarvis, Patricia GARCIA, about the patient's  condition. He informed me that their office does not consult to [facility] and to call the on call ENT.     9:07 PM Paged ENT.    9:12 PM Spoke with Dr. Rolon, ENT, about the patient's condition.  She will consult.    INITIAL ASSESSMENT, COURSE AND PLAN  Care Narrative: This is an 88-year-old male with a history of difficulty swallowing and pain in his throat over the past several days.  On imaging that was performed at [facility], the patient had what appeared to be a bunch of food in his vallecula.  This appears to be the source of his symptoms there is there is nothing further down on CT.  There is no evidence of significant aspiration.  The patient appears to have required IV fluids both times he came to the ER and has not been able to eat anything given the amount of pain.  He is comfortable at this time.  He does appear to be in A-fib with RVR and was started on some fluids and diltiazem as a bolus.  Troponin is negative.  His labs are otherwise reassuring.  The case was discussed with Dr. Rolon from ENT.  She will consult.  The patient will be admitted to the hospitalist for work-up of new onset A-fib with RVR.  I am going to withhold blood thinners at this time as the patient is likely to undergo procedure in the near future for his throat.    HYDRATION: Based on the patient's presentation of Dehydration the patient was given IV fluids. IV Hydration was used because oral hydration was not adequate alone. Upon recheck following hydration, the patient was feeling improved.  ADDITIONAL PROBLEM LIST      DISPOSITION AND DISCUSSIONS  I have discussed management of the patient with the following physicians and LISSETT's:  Dr. Jarvis, ENT, Dr. Rolon, ENT    Discussion of management with other [facility] or appropriate source(s): None     FINAL DIANGOSIS  1. Vallecular mass    2. Atrial fibrillation with rapid ventricular response (HCC)      DISPOSITION:  Patient will be hospitalized by the hospitalist  in guarded condition.      I, Maria Eugenia Moya (Scribe), am scribing for, and in the presence of, Chip Arauz M.D..    Electronically signed by: Maria Eugenia Moya (Scribsandra), 7/8/2023    IChip M.D. personally performed the services described in this documentation, as scribed by Maria Eugenia Moya in my presence, and it is both accurate and complete.     The note accurately reflects work and decisions made by me.  Chip Arauz M.D.  7/8/2023  9:48 PM

## 2023-07-09 NOTE — PROGRESS NOTES
Bedside report received. No significant overnight events per night RN. Patient A&O x 4. Pt's voice is extremely hoarse. Pt endorses poor oral intake due to food getting caught in throat which causes severe pain. VS'S. AFIB on telemetry monitor. Per speech therapist there was some concern regarding left tongue deviation. However there is no other focal neurological deficits and pt is able to move tongue side to side.  MD at bedside to evaluate. POC discussed with patient. Pt verbalizes understanding. Call light and belongings with in reach. Bed locked and in lowest position, alarm and fall precautions in place.

## 2023-07-09 NOTE — ED NOTES
Pt swallowed pills without much difficulty. Was able to drink chicken broth and eat jello successfully

## 2023-07-09 NOTE — THERAPY
"Speech Language Pathology   Clinical Swallow Evaluation     Patient Name: Abhilash Garcia  AGE:  88 y.o., SEX:  male  Medical Record #: [id number]  Date of Service: 7/9/2023      History of Present Illness  88 y.o. male who presents to the ED complaining of throat pain onset four days ago. He locates the pain to the right side of his throat.  PMHx: atrial fibrillation on Eliquis, ulcerative colitis, hypertension, hyperlipidemia     He had multiple CT chest and CT soft tissues neck --which did not show any obstruction or leak.  ENT was consulted by ERP, formal evaluation to follow in AM.     CT of neck 7/8/23:  There is opacification of the vallecula bilaterally, new compared to the prior exam.  This has the appearance of ingested material, it given the presence of mottled air.  Prominent palatine tonsils.  Right mastoid effusion.      Flexible fiberoptic laryngoscopy 7/9/23:  \"CT with inflammatory changes in palatine tonsils, lingual tonsils, epiglottis, uvula. Scope today confirms these findings with edema throughout. Airway widely patent. Some hoarseness and left vocal fold is sluggish vs right. No evidence of tumors or masses and no food impaction present.\"      General Information:  Vitals  O2 Delivery Device: None - Room Air  Level of Consciousness: Alert, Awake  Orientation: Oriented x 4  Follows Directives: Yes      Prior Living Situation & Level of Function:  Communication: WFL  Swallowing: Acute onset of dysphagia and odynophagia ~2 days ago. Pt reported choking on pills and \"build up\" of water in his throat with nasal regurgitation. Pt reported most difficutly with textures such as \"potato chips\" with associated globus sensation.       Oral Mechanism Evaluation:  Dentition: Fair, Missing posterior dentition   Facial Symmetry: Equal  Facial Sensation: Equal     Labial Observations: WFL   Lingual Observations: Lingual fasciculations, Left lingual deviation (mild faciculations)  Motor Speech: WFL        "     Laryngeal Function:  Secretion Management: Adequate  Voice Quality: Hoarse  Cough: Perceptually WNL         Subjective: Patient received awake and AAOx4. Pt independently repositioned self to EOB for session. Pt appropriate in conversation and good historian. See above for details regarding hx of dysphagia.       Assessment  Current Method of Nutrition: Oral diet (clear liquid diet)  Positioning: Turcios's (60-90 degrees)  Bolus Administration: Patient    O2 Delivery Device: None - Room Air  Factor(s) Affecting Performance: None  Tracheostomy : No         Swallowing Trials:  Swallowing Trials  Ice: WFL  Thin Liquid (TN0): Impaired  Liquidised (LQ3): Impaired    Comments: Adequate bolus acceptance and containment. Onset of swallow trigger was mildly delayed with some effort noted likely d/t reported odynophagia (7/10). Pt required 2-3 swallows to clear applesauce bolus, single swallow with thins. Pt had throat clearing response following thins and applesauce concerning for airway invasion. Pt reported one episode of thins pushing up in the back of the throat and up his nose although no nasal regurgitation occurred.       Clinical Impressions  Patient is presenting with clinical signs of oropharyngeal dysphagia with known pharyngeal edema and sluggish L vocal cord motion noted on today's laryngoscopy. A diagnostic swallow study is indicated to further assess oropharyngeal function and determine safest mode for nutrition.        Recommendations  Diet Consistency: Clear liquid diet  Instrumentation: VFSS (MBSS)  Medication: As tolerated  Supervision: Independent  Positioning: Fully upright and midline during oral intake  Risk Management : Small bites/sips, Slow rate of intake  Oral Care: Q4h  Consult Referral(s): ENT      SLP Treatment Plan  Treatment Plan: Dysphagia Treatment, Patient/Family/Caregiver Training  SLP Frequency: 3x Per Week  Estimated Duration: Until Therapy Goals Met      Anticipated Discharge  "Needs  Discharge Recommendations: Recommend post-acute placement for additional speech therapy services prior to discharge home   Therapy Recommendations Upon DC: Dysphagia Training, Patient / Family / Caregiver Education        Patient / Family Goals  Patient / Family Goal #1: \"I want to figure out what is going on\"  Short Term Goals  Short Term Goal # 1: Patient will participate in MBSS to further assess oropharyngeal function.      Pauline Hein, SLP   "

## 2023-07-09 NOTE — CONSULTS
CC: dysphagia    HPI: Abhilash Garcia is a 88 y.o. male with 5 days of throat pain and dysphagia. No URI symptoms, but having odynophagia and had decreased oral intake the past few days. Also found to be in new afib with RVR.    Past Medical History:   Diagnosis Date    ASTHMA     Blood in stool     CATARACT     Hypertension     Maintenance chemotherapy following disease     Melanoma (HCC)     Pneumonia     Ulcer     ULCERATIVE COLITIS      Past Surgical History:   Procedure Laterality Date    BURSA EXCISION  2012    PROSTATECTOMY, RADICAL RETRO  2010    CATARACT EXTRACTION WITH IOL  2008    TRIGGER FINGER RELEASE  2006    left hand    TRIGGER FINGER RELEASE  2000    right hand    MOLE EXCISION  1995    LYMPHADENECTOMY  1983     No current facility-administered medications on file prior to encounter.     Current Outpatient Medications on File Prior to Encounter   Medication Sig Dispense Refill    traMADol (ULTRAM) 50 MG Tab Take 100 mg by mouth 2 times a day. 100 MG = 2 TABS      celecoxib (CELEBREX) 200 MG Cap Take 200 mg by mouth every day.      tamsulosin (FLOMAX) 0.4 MG capsule Take 0.4 mg by mouth every day.      Probiotic Product (PROBIOTIC PO) Take 1 Tablet by mouth 2 times a day.      CALCIUM PO Take 1 Tablet by mouth every day.      aspirin (ASA) 325 MG Tab Take 325 mg by mouth every 6 hours as needed for Mild Pain.      apixaban (ELIQUIS) 5mg Tab Take 1 Tablet by mouth 2 times a day. (Patient taking differently: Take 5 mg by mouth 2 times a day. 5 mg BID) 60 Tablet 1    metoprolol tartrate (LOPRESSOR) 25 MG Tab Take 0.5 Tablets by mouth 2 times a day. (Patient taking differently: Take 12.5 mg by mouth 2 times a day. 25 mg  x .5 tab = 12.5 mg BID) 30 Tablet 1    atorvastatin (LIPITOR) 10 MG Tab TAKE ONE TABLET BY MOUTH DAILY (Patient taking differently: Take 10 mg by mouth every day.) 90 Tablet 3    tadalafil (CIALIS) 10 MG tablet Take 1 Tablet by mouth 1 time a day as needed for Erectile Dysfunction. 10  "Tablet 3    Multiple Vitamins-Minerals (ZINC PO) Take  by mouth.      amLODIPine (NORVASC) 10 MG Tab TAKE ONE TABLET BY MOUTH DAILY INDICATIONS: HIGH BLOOD PRESSURE (Patient taking differently: Take 10 mg by mouth every day.) 90 Tablet 3    losartan (COZAAR) 100 MG Tab TAKE ONE TABLET BY MOUTH DAILY (Patient taking differently: Take 100 mg by mouth every day.) 90 Tablet 3    fluticasone (FLONASE) 50 MCG/ACT nasal spray as needed.      Cholecalciferol (VITAMIN D3) 2000 units Tab Take 8,000 Units by mouth 2 times a day. 8,000UNITS=4 TABS      magnesium oxide (MAG-OX) 400 MG Tab Take 200 mg by mouth every day.       No Known Allergies  Family and Occupational History     Socioeconomic History    Marital status:      Spouse name: Not on file    Number of children: Not on file    Years of education: Not on file    Highest education level: Not on file   Occupational History    Not on file         O:  /80   Pulse 100   Temp 36.9 °C (98.4 °F) (Temporal)   Resp 16   Ht 1.778 m (5' 10\")   Wt 76.8 kg (169 lb 5 oz)   SpO2 93%   Gen: interactive and appropriate  Pulm: Breathing comfortably on RA without stridor or stertor  Voice hoarse  Face: symmetric, no edema, facial strength intact bilaterally  Eyes: conjunctiva clear, no chemosis. Vision grossly intact bilaterally  Nose: patent, with moist mucosa. Septal deviation, some crusting left nare   OC/OP: clear, no masses. Dentition intact. Tongue deviates. Floor of mouth soft and flat  Neck: flat, no discrete masses  Lymph: no palpable lymphadenopathy in neck or parotid  Neuro: cranial nerves grossly intact bilaterally. Mood appropriate  Ext: no edema    PROCEDURE: Flexible fiberoptic laryngoscopy.   Consent was obtained. Scope passed into the right nares, nasal cavity and nasopharynx clear. Uvula mildly edematous. Mild edema of lingual tonsils/base of tongue area extending onto arytenoids and supraglottic airway. Oropharynx, hypopharynx, supraglottis, and " glottis all clear without any lesions or masses. No food impacted or retained. Normal vocal fold motion on the right, left somewhat sluggish but mobile. Visualized subglottis is clear. Airway patent      Recent Labs     07/07/23  0930 07/08/23 2001 07/09/23  0545   WBC 9.3 8.4 8.7   RBC 4.95 5.14 4.79   HEMOGLOBIN 14.7 14.9 14.3   HEMATOCRIT 45.0 46.8 43.2   MCV 90.9 91.1 90.2   MCH 29.7 29.0 29.9   MCHC 32.7* 31.8* 33.1   RDW 13.8 46.4 45.0   PLATELETCT 204 229 216   MPV 9.6 9.5 9.6     Recent Labs     07/08/23  1035 07/08/23 2001 07/09/23  0545   SODIUM 144 138 141   POTASSIUM 4.0 4.2 4.3   CHLORIDE 107 101 104   CO2 28 20 20   GLUCOSE 113* 140* 136*   BUN 21* 24* 28*   CREATININE 1.0 0.88 0.89   CALCIUM 9.0 9.1 8.6               A/P:Abhilash Garcia is a 88 y.o. male with odynophagia, dysphagia, throat pain the past 5 days. CT with inflammatory changes in palatine tonsils, lingual tonsils, epiglottis, uvula. Scope today confirms these findings with edema throughout. Airway widely patent. Some hoarseness and left vocal fold is sluggish vs right. No evidence of tumors or masses and no food impaction present. On steroids, consider adding antibiotics as well for potential infectious cause. Consider angioedema, though no new medications or other obvious cause present and has been ongoing for 5 days. Agree with MBSS scheduled for later today. Could consider MRI if no improvement given tongue deviation and sluggish left vocal cord (unclear if these are new or not). Patient has ENT physician in [place] already established.    Thank you for the consultation. Please call with questions or concerns.    María Rolon M.D.  [phone]

## 2023-07-09 NOTE — ED NOTES
Med Rec complete per patient   Allergies reviewed  No oral antibiotics in the last 30 days  Preferred pharmacy: [facility] in [place]    Patient states he took 50 mg Tramadol yesterday evening  mg    Patient can't remember which of the 2 blood pressure medications he takes in the morning and evening

## 2023-07-09 NOTE — PROGRESS NOTES
Hospital Medicine Daily Progress Note    Date of Service  7/9/2023    Chief Complaint  Abhilash Garcia is a 88 y.o. male admitted 7/8/2023 with odynophagia    Hospital Course  88-year-old male with a past medical history of atrial fibrillation on Eliquis, ulcerative colitis, hypertension, and hyperlipidemia was admitted for dysphagia secondary to tonsillar, lingular, epiglottis and uvular inflammation.  Patient was started on systemic steroids.  ENT evaluated bedside.    Interval Problem Update  Patient was evaluated bedside  Still having some odynophagia as well as hoarseness  Stable vitals and on room air  Stable labs  SPL eval at bedside, pending MBSS  ENT evaluated at bedside and in agreement with continuing steroids and recommended initiating antibiotics  Start Augmentin  Pending brain MRI    I have discussed this patient's plan of care and discharge plan at IDT rounds today with Case Management, Nursing, Nursing leadership, and other members of the IDT team.    Consultants/Specialty  ENT    Code Status  Full Code    Disposition  The patient is not medically cleared for discharge to home or a post-acute facility.  Anticipate discharge to: home with close outpatient follow-up    I have placed the appropriate orders for post-discharge needs.    Review of Systems  Review of Systems   HENT:  Positive for sore throat.         Physical Exam  Temp:  [36.8 °C (98.2 °F)-37 °C (98.6 °F)] 36.9 °C (98.4 °F)  Pulse:  [] 95  Resp:  [12-23] 16  BP: (106-137)/(72-88) 126/83  SpO2:  [92 %-96 %] 94 %    Physical Exam  Constitutional:       General: He is not in acute distress.     Appearance: Normal appearance.   HENT:      Head: Normocephalic and atraumatic.      Nose: Nose normal. No congestion.      Mouth/Throat:      Pharynx: Posterior oropharyngeal erythema present.   Eyes:      Extraocular Movements: Extraocular movements intact.      Pupils: Pupils are equal, round, and reactive to light.   Cardiovascular:      Rate  and Rhythm: Normal rate and regular rhythm.      Pulses: Normal pulses.      Heart sounds: Normal heart sounds.   Pulmonary:      Effort: Pulmonary effort is normal.      Breath sounds: Normal breath sounds.   Abdominal:      General: Bowel sounds are normal.      Palpations: Abdomen is soft.      Tenderness: There is no abdominal tenderness.   Musculoskeletal:         General: No swelling. Normal range of motion.      Cervical back: Normal range of motion and neck supple.   Skin:     General: Skin is warm.      Coloration: Skin is not jaundiced.   Neurological:      General: No focal deficit present.      Mental Status: He is alert and oriented to person, place, and time. Mental status is at baseline.      Cranial Nerves: No cranial nerve deficit.   Psychiatric:         Mood and Affect: Mood normal.         Behavior: Behavior normal.         Thought Content: Thought content normal.         Judgment: Judgment normal.         Fluids    Intake/Output Summary (Last 24 hours) at 7/9/2023 1503  Last data filed at 7/9/2023 0600  Gross per 24 hour   Intake 240 ml   Output --   Net 240 ml       Laboratory  Recent Labs     07/07/23  0930 07/08/23 2001 07/09/23  0545   WBC 9.3 8.4 8.7   RBC 4.95 5.14 4.79   HEMOGLOBIN 14.7 14.9 14.3   HEMATOCRIT 45.0 46.8 43.2   MCV 90.9 91.1 90.2   MCH 29.7 29.0 29.9   MCHC 32.7* 31.8* 33.1   RDW 13.8 46.4 45.0   PLATELETCT 204 229 216   MPV 9.6 9.5 9.6     Recent Labs     07/08/23  1035 07/08/23 2001 07/09/23  0545   SODIUM 144 138 141   POTASSIUM 4.0 4.2 4.3   CHLORIDE 107 101 104   CO2 28 20 20   GLUCOSE 113* 140* 136*   BUN 21* 24* 28*   CREATININE 1.0 0.88 0.89   CALCIUM 9.0 9.1 8.6                   Imaging  OUTSIDE IMAGES-CT CHEST   Final Result      OUTSIDE IMAGES-CT CERVICAL SPINE   Final Result      OUTSIDE IMAGES-CT HEAD   Final Result      EC-ECHOCARDIOGRAM COMPLETE W/O CONT    (Results Pending)   DX-ESOPHAGUS - [id number]    (Results Pending)   MR-BRAIN-W/O    (Results  Pending)        Assessment/Plan  * Dysphagia- (present on admission)  Assessment & Plan  No acute findings noted on CT chest and CT soft tissue neck at [facility] prior to transfer  Decadron for now  No evidence of airway compromise at this time    Aspiration precautions  Speech eval  ENT consulted by ERP, follow-up appreciated    Dehydration  Assessment & Plan  IVF    Hyperglycemia  Assessment & Plan  ISS    Atrial fibrillation with RVR (HCC)  Assessment & Plan  RVR resolved  New onset of A-fib diagnosed yesterday --started on Eliquis  Hold Eliquis for now until assurance of no further invasive intervention    Rate control: Metoprolol  -PRN IV BB    Maintain potassium above 4, magnesium above 2  Trend CE, EKG  Check echo    Essential hypertension- (present on admission)  Assessment & Plan  Amlodipine, metoprolol    ACP (advance care planning)  Assessment & Plan  Discussed goal of care with patient and patient's son in ED.  Patient is agreeable for noninvasive and invasive/life-sustaining heroic measures- agreeable for CPR/defibrillation/intubation or mechanical ventilation.  He is also agreeable for further diagnostic and work-up for dysphagia, ENT evaluation.  Additionally, he is interested in further diagnostic work-up/treatment of new newly diagnosed atrial fibrillation.  Questions and concerns addressed to patient and patient's son in ED. ACP: 18 minutes    Colitis, ulcerative (HCC)- (present on admission)  Assessment & Plan  Per history  Outpatient follow-up    Hyperlipidemia- (present on admission)  Assessment & Plan  Statin         VTE prophylaxis: therapeutic anticoagulation with Eliquis    I have performed a physical exam and reviewed and updated ROS and Plan today (7/9/2023). In review of yesterday's note (7/8/2023), there are no changes except as documented above.

## 2023-07-09 NOTE — H&P
"Hospital Medicine History & Physical Note    Date of Service  7/8/2023    Primary Care Physician  Ike Alvarez M.D.    Consultants  ENT (Dr. Rolon)    Code Status  Full Code    Chief Complaint  Chief Complaint   Patient presents with    Other     Patient sent CVMS for sore throat and feeling like he has something stuck in throat. Patient voice is \"more gravel\" sounding over the day. The patient reports pain and feeling like something is stuck x 4 days. Patient was also dx with afib yesterday and started on medications but has been unable to take them due to unable to swallow.        History of Presenting Illness  Abhilash Garcia is a 88 y.o. male with recent diagnosis of atrial fibrillation on Eliquis, ulcerative colitis, hypertension, hyperlipidemia who presented 7/8/2023 with evaluation for dysphagia, difficulty swallowing.  Patient reported having difficulty swallowing, felt less something stuck behind his throat which has been ongoing for the past 5 days.  He was just diagnosed with atrial fibrillation and started on Eliquis, however reported unable to swallow.  He initially presented to [facility], transferred to [facility] ER for ENT evaluation.  He had multiple CT chest and CT soft tissues neck --which did not show any obstruction or leak.  ENT was consulted by ERP, formal evaluation to follow in AM.  Admitted to medicine service for further evaluation and treatment.    I discussed the plan of care with patient, family, and bedside RN.    Review of Systems  Review of Systems   Constitutional:  Positive for malaise/fatigue. Negative for chills and fever.   HENT:  Negative for hearing loss and tinnitus.         Dysphagia   Eyes:  Negative for blurred vision and double vision.   Respiratory:  Negative for cough and shortness of breath.    Cardiovascular:  Positive for palpitations. Negative for chest pain.   Gastrointestinal:  Positive for heartburn and nausea. Negative for abdominal pain and vomiting.        Dysphagia "   Genitourinary:  Negative for dysuria and hematuria.   Musculoskeletal:  Negative for joint pain and myalgias.   Skin:  Negative for itching and rash.   Neurological:  Positive for weakness. Negative for dizziness and headaches.   Endo/Heme/Allergies:  Negative for environmental allergies. Does not bruise/bleed easily.   Psychiatric/Behavioral:  Negative for depression and substance abuse.    All other systems reviewed and are negative.      Past Medical History   has a past medical history of ASTHMA, Blood in stool, CATARACT, Hypertension, Maintenance chemotherapy following disease, Melanoma (HCC), Pneumonia, Ulcer, and ULCERATIVE COLITIS.    Surgical History   has a past surgical history that includes lymphadenectomy (1983); mole excision (1995); trigger finger release (2000); trigger finger release (2006); cataract extraction with iol (2008); prostatectomy, radical retro (2010); and bursa excision (2012).     Family History  family history includes Cancer in his father and mother; Diabetes in his sister.   Family history reviewed with patient. There is family history that is pertinent to the chief complaint.     Social History   reports that he quit smoking about 62 years ago. His smoking use included cigarettes. He has a 18.00 pack-year smoking history. He has never used smokeless tobacco. He reports that he does not drink alcohol and does not use drugs.    Allergies  No Known Allergies    Medications  Prior to Admission Medications   Prescriptions Last Dose Informant Patient Reported? Taking?   CALCIUM PO 7/7/2023 at PM Patient Yes Yes   Sig: Take 1 Tablet by mouth every day.   Cholecalciferol (VITAMIN D3) 2000 units Tab 7/7/2023 at PM Patient Yes No   Sig: Take 8,000 Units by mouth 2 times a day. 8,000UNITS=4 TABS   Multiple Vitamins-Minerals (ZINC PO) 7/7/2023 at PM Patient Yes No   Sig: Take  by mouth.   Probiotic Product (PROBIOTIC PO) 7/7/2023 at PM Patient Yes Yes   Sig: Take 1 Tablet by mouth 2 times a  day.   amLODIPine (NORVASC) 10 MG Tab 7/7/2023 at UNK Patient No No   Sig: TAKE ONE TABLET BY MOUTH DAILY INDICATIONS: HIGH BLOOD PRESSURE   Patient taking differently: Take 10 mg by mouth every day.   apixaban (ELIQUIS) 5mg Tab 7/7/2023 at PM Patient No No   Sig: Take 1 Tablet by mouth 2 times a day.   Patient taking differently: Take 5 mg by mouth 2 times a day. 5 mg BID   aspirin (ASA) 325 MG Tab 7/6/2023 at UNK Patient Yes Yes   Sig: Take 325 mg by mouth every 6 hours as needed for Mild Pain.   atorvastatin (LIPITOR) 10 MG Tab 7/7/2023 at PM Patient No No   Sig: TAKE ONE TABLET BY MOUTH DAILY   Patient taking differently: Take 10 mg by mouth every day.   celecoxib (CELEBREX) 200 MG Cap 7/7/2023 at AM Patient Yes Yes   Sig: Take 200 mg by mouth every day.   fluticasone (FLONASE) 50 MCG/ACT nasal spray PRN at PRN Patient Yes No   Sig: as needed.   losartan (COZAAR) 100 MG Tab 7/7/2023 at K Patient No No   Sig: TAKE ONE TABLET BY MOUTH DAILY   Patient taking differently: Take 100 mg by mouth every day.   magnesium oxide (MAG-OX) 400 MG Tab 7/7/2023 at PM Patient Yes No   Sig: Take 200 mg by mouth every day.   metoprolol tartrate (LOPRESSOR) 25 MG Tab 7/7/2023 at PM Patient No No   Sig: Take 0.5 Tablets by mouth 2 times a day.   Patient taking differently: Take 12.5 mg by mouth 2 times a day. 25 mg  x .5 tab = 12.5 mg BID   tadalafil (CIALIS) 10 MG tablet PRN at PRN Patient No No   Sig: Take 1 Tablet by mouth 1 time a day as needed for Erectile Dysfunction.   tamsulosin (FLOMAX) 0.4 MG capsule 7/7/2023 at PM Patient Yes Yes   Sig: Take 0.4 mg by mouth every day.   traMADol (ULTRAM) 50 MG Tab 7/7/2023 at AM Patient Yes Yes   Sig: Take 100 mg by mouth 2 times a day. 100 MG = 2 TABS      Facility-Administered Medications: None       Physical Exam  Temp:  [36.7 °C (98 °F)-37 °C (98.6 °F)] 37 °C (98.6 °F)  Pulse:  [] 116  Resp:  [12-95] 18  BP: (113-137)/(72-90) 136/88  SpO2:  [89 %-96 %] 96 %  Blood Pressure :  137/72   Temperature: 36.9 °C (98.4 °F)   Pulse: (!) 108   Respiration: (!) 23   Pulse Oximetry: 95 %       Physical Exam  Vitals and nursing note reviewed.   Constitutional:       General: He is not in acute distress.     Appearance: He is ill-appearing.   HENT:      Head: Normocephalic and atraumatic.      Nose: Nose normal.      Mouth/Throat:      Mouth: Mucous membranes are dry.      Pharynx: Oropharynx is clear.   Eyes:      General: No scleral icterus.     Extraocular Movements: Extraocular movements intact.   Cardiovascular:      Rate and Rhythm: Tachycardia present. Rhythm irregular.      Pulses: Normal pulses.      Heart sounds:      No friction rub.   Pulmonary:      Effort: No respiratory distress.      Breath sounds: No wheezing or rales.   Chest:      Chest wall: No tenderness.   Abdominal:      General: There is distension.      Tenderness: There is no abdominal tenderness. There is no guarding or rebound.   Musculoskeletal:         General: No tenderness. Normal range of motion.      Cervical back: Neck supple. No tenderness.   Skin:     General: Skin is warm and dry.      Capillary Refill: Capillary refill takes less than 2 seconds.   Neurological:      General: No focal deficit present.      Mental Status: He is alert and oriented to person, place, and time.   Psychiatric:         Mood and Affect: Mood normal.         Laboratory:  Recent Labs     07/07/23  0930 07/08/23 2001   WBC 9.3 8.4   RBC 4.95 5.14   HEMOGLOBIN 14.7 14.9   HEMATOCRIT 45.0 46.8   MCV 90.9 91.1   MCH 29.7 29.0   MCHC 32.7* 31.8*   RDW 13.8 46.4   PLATELETCT 204 229   MPV 9.6 9.5     Recent Labs     07/07/23 0930 07/08/23  1035 07/08/23 2001   SODIUM 144 144 138   POTASSIUM 4.3 4.0 4.2   CHLORIDE 107 107 101   CO2 25 28 20   GLUCOSE 146* 113* 140*   BUN 24* 21* 24*   CREATININE 1.1 1.0 0.88   CALCIUM 9.3 9.0 9.1     Recent Labs     07/07/23 0930 07/08/23  1035 07/08/23 2001   ALTSGPT 20  --  10   ASTSGOT 15  --  9*    ALKPHOSPHAT 63  --  65   TBILIRUBIN 1.0  --  1.2   LIPASE 33  --   --    GLUCOSE 146* 113* 140*         No results for input(s): NTPROBNP in the last 72 hours.      Recent Labs     07/08/23 2001   TROPONINT 12       Imaging:  EC-ECHOCARDIOGRAM COMPLETE W/O CONT    (Results Pending)       EKG:  I have personally reviewed the images and compared with prior images.    Assessment/Plan:  Justification for Admission Status  I anticipate this patient  will require at least 2 midnights hospitalization, therefore appropriate for inpatient status.        * Dysphagia- (present on admission)  Assessment & Plan  No acute findings noted on CT chest and CT soft tissue neck at [facility] prior to transfer  Decadron for now  No evidence of airway compromise at this time    Aspiration precautions  Speech eval  ENT consulted by ERP, follow-up appreciated    Dehydration  Assessment & Plan  IVF    Hyperglycemia  Assessment & Plan  ISS    Atrial fibrillation with RVR (HCC)  Assessment & Plan  RVR resolved  New onset of A-fib diagnosed yesterday --started on Eliquis  Hold Eliquis for now until assurance of no further invasive intervention    Rate control: Metoprolol  -PRN IV BB    Maintain potassium above 4, magnesium above 2  Trend CE, EKG  Check echo    Essential hypertension- (present on admission)  Assessment & Plan  Amlodipine, metoprolol    Colitis, ulcerative (HCC)- (present on admission)  Assessment & Plan  Per history  Outpatient follow-up    Hyperlipidemia- (present on admission)  Assessment & Plan  Statin    ACP (advance care planning)  Assessment & Plan  Discussed goal of care with patient and patient's son in ED.  Patient is agreeable for noninvasive and invasive/life-sustaining heroic measures- agreeable for CPR/defibrillation/intubation or mechanical ventilation.  He is also agreeable for further diagnostic and work-up for dysphagia, ENT evaluation.  Additionally, he is interested in further diagnostic work-up/treatment of new  newly diagnosed atrial fibrillation.  Questions and concerns addressed to patient and patient's son in ED. ACP: 18 minutes        VTE prophylaxis: SCDs/TEDs

## 2023-07-09 NOTE — CARE PLAN
Problem: Knowledge Deficit - Standard  Goal: Patient and family/care givers will demonstrate understanding of plan of care, disease process/condition, diagnostic tests and medications  Outcome: Progressing     Problem: Fall Risk  Goal: Patient will remain free from falls  7/9/2023 0359 by Bri Montes, R.N.  Outcome: Progressing  7/9/2023 0358 by ABIDA CadenaN.  Outcome: Progressing   The patient is Watcher - Medium risk of patient condition declining or worsening         Progress made toward(s) clinical / shift goals:  Pt verbalized understanding of safety plan. Bed in low position and locked, call light in reach.

## 2023-07-10 ENCOUNTER — APPOINTMENT (OUTPATIENT)
Dept: CARDIOLOGY | Facility: MEDICAL CENTER | Age: 88
DRG: 153 | End: 2023-07-10
Attending: STUDENT IN AN ORGANIZED HEALTH CARE EDUCATION/TRAINING PROGRAM
Payer: MEDICARE

## 2023-07-10 ENCOUNTER — APPOINTMENT (OUTPATIENT)
Dept: RADIOLOGY | Facility: MEDICAL CENTER | Age: 88
DRG: 153 | End: 2023-07-10
Attending: STUDENT IN AN ORGANIZED HEALTH CARE EDUCATION/TRAINING PROGRAM
Payer: MEDICARE

## 2023-07-10 VITALS
RESPIRATION RATE: 16 BRPM | HEART RATE: 76 BPM | OXYGEN SATURATION: 95 % | HEIGHT: 70 IN | TEMPERATURE: 97.7 F | WEIGHT: 169.31 LBS | DIASTOLIC BLOOD PRESSURE: 75 MMHG | SYSTOLIC BLOOD PRESSURE: 119 MMHG | BODY MASS INDEX: 24.24 KG/M2

## 2023-07-10 LAB
ALBUMIN SERPL BCP-MCNC: 3.5 G/DL (ref 3.2–4.9)
ALBUMIN/GLOB SERPL: 1.5 G/DL
ALP SERPL-CCNC: 53 U/L (ref 30–99)
ALT SERPL-CCNC: 8 U/L (ref 2–50)
ANION GAP SERPL CALC-SCNC: 12 MMOL/L (ref 7–16)
AST SERPL-CCNC: 7 U/L (ref 12–45)
BASOPHILS # BLD AUTO: 0.2 % (ref 0–1.8)
BASOPHILS # BLD: 0.02 K/UL (ref 0–0.12)
BILIRUB SERPL-MCNC: 0.8 MG/DL (ref 0.1–1.5)
BUN SERPL-MCNC: 32 MG/DL (ref 8–22)
CALCIUM ALBUM COR SERPL-MCNC: 8.8 MG/DL (ref 8.5–10.5)
CALCIUM SERPL-MCNC: 8.4 MG/DL (ref 8.5–10.5)
CHLORIDE SERPL-SCNC: 104 MMOL/L (ref 96–112)
CO2 SERPL-SCNC: 21 MMOL/L (ref 20–33)
CREAT SERPL-MCNC: 0.77 MG/DL (ref 0.5–1.4)
EOSINOPHIL # BLD AUTO: 0 K/UL (ref 0–0.51)
EOSINOPHIL NFR BLD: 0 % (ref 0–6.9)
ERYTHROCYTE [DISTWIDTH] IN BLOOD BY AUTOMATED COUNT: 44.3 FL (ref 35.9–50)
GFR SERPLBLD CREATININE-BSD FMLA CKD-EPI: 86 ML/MIN/1.73 M 2
GLOBULIN SER CALC-MCNC: 2.4 G/DL (ref 1.9–3.5)
GLUCOSE BLD STRIP.AUTO-MCNC: 154 MG/DL (ref 65–99)
GLUCOSE SERPL-MCNC: 144 MG/DL (ref 65–99)
HCT VFR BLD AUTO: 40.5 % (ref 42–52)
HGB BLD-MCNC: 13.6 G/DL (ref 14–18)
IMM GRANULOCYTES # BLD AUTO: 0.08 K/UL (ref 0–0.11)
IMM GRANULOCYTES NFR BLD AUTO: 0.7 % (ref 0–0.9)
LV EJECT FRACT  99904: 50
LV EJECT FRACT MOD 2C 99903: 45
LV EJECT FRACT MOD 4C 99902: 46.9
LV EJECT FRACT MOD BP 99901: 45.57
LYMPHOCYTES # BLD AUTO: 1.43 K/UL (ref 1–4.8)
LYMPHOCYTES NFR BLD: 13 % (ref 22–41)
MCH RBC QN AUTO: 29.8 PG (ref 27–33)
MCHC RBC AUTO-ENTMCNC: 33.6 G/DL (ref 32.3–36.5)
MCV RBC AUTO: 88.8 FL (ref 81.4–97.8)
MONOCYTES # BLD AUTO: 0.54 K/UL (ref 0–0.85)
MONOCYTES NFR BLD AUTO: 4.9 % (ref 0–13.4)
NEUTROPHILS # BLD AUTO: 8.9 K/UL (ref 1.82–7.42)
NEUTROPHILS NFR BLD: 81.2 % (ref 44–72)
NRBC # BLD AUTO: 0 K/UL
NRBC BLD-RTO: 0 /100 WBC (ref 0–0.2)
PLATELET # BLD AUTO: 230 K/UL (ref 164–446)
PMV BLD AUTO: 9.7 FL (ref 9–12.9)
POTASSIUM SERPL-SCNC: 4.6 MMOL/L (ref 3.6–5.5)
PROT SERPL-MCNC: 5.9 G/DL (ref 6–8.2)
RBC # BLD AUTO: 4.56 M/UL (ref 4.7–6.1)
SODIUM SERPL-SCNC: 137 MMOL/L (ref 135–145)
WBC # BLD AUTO: 11 K/UL (ref 4.8–10.8)

## 2023-07-10 PROCEDURE — 99239 HOSP IP/OBS DSCHRG MGMT >30: CPT | Performed by: STUDENT IN AN ORGANIZED HEALTH CARE EDUCATION/TRAINING PROGRAM

## 2023-07-10 PROCEDURE — A9270 NON-COVERED ITEM OR SERVICE: HCPCS | Performed by: STUDENT IN AN ORGANIZED HEALTH CARE EDUCATION/TRAINING PROGRAM

## 2023-07-10 PROCEDURE — 36415 COLL VENOUS BLD VENIPUNCTURE: CPT

## 2023-07-10 PROCEDURE — 700102 HCHG RX REV CODE 250 W/ 637 OVERRIDE(OP): Performed by: STUDENT IN AN ORGANIZED HEALTH CARE EDUCATION/TRAINING PROGRAM

## 2023-07-10 PROCEDURE — 97162 PT EVAL MOD COMPLEX 30 MIN: CPT

## 2023-07-10 PROCEDURE — 700102 HCHG RX REV CODE 250 W/ 637 OVERRIDE(OP)

## 2023-07-10 PROCEDURE — 97535 SELF CARE MNGMENT TRAINING: CPT

## 2023-07-10 PROCEDURE — 80053 COMPREHEN METABOLIC PANEL: CPT

## 2023-07-10 PROCEDURE — 93306 TTE W/DOPPLER COMPLETE: CPT | Mod: 26 | Performed by: INTERNAL MEDICINE

## 2023-07-10 PROCEDURE — 85025 COMPLETE CBC W/AUTO DIFF WBC: CPT

## 2023-07-10 PROCEDURE — A9270 NON-COVERED ITEM OR SERVICE: HCPCS

## 2023-07-10 PROCEDURE — 93306 TTE W/DOPPLER COMPLETE: CPT

## 2023-07-10 PROCEDURE — 70551 MRI BRAIN STEM W/O DYE: CPT

## 2023-07-10 PROCEDURE — 82962 GLUCOSE BLOOD TEST: CPT

## 2023-07-10 PROCEDURE — 700111 HCHG RX REV CODE 636 W/ 250 OVERRIDE (IP): Mod: JZ | Performed by: STUDENT IN AN ORGANIZED HEALTH CARE EDUCATION/TRAINING PROGRAM

## 2023-07-10 RX ORDER — METHYLPREDNISOLONE 4 MG/1
TABLET ORAL
Qty: 21 TABLET | Refills: 0 | Status: SHIPPED | OUTPATIENT
Start: 2023-07-10 | End: 2023-07-20

## 2023-07-10 RX ORDER — ACETAMINOPHEN 500 MG
1000 TABLET ORAL EVERY 8 HOURS PRN
Qty: 90 TABLET | Refills: 0 | Status: SHIPPED | OUTPATIENT
Start: 2023-07-10

## 2023-07-10 RX ORDER — GUAIFENESIN/DEXTROMETHORPHAN 100-10MG/5
10 SYRUP ORAL EVERY 6 HOURS PRN
Qty: 840 ML | Refills: 0 | Status: SHIPPED
Start: 2023-07-10 | End: 2023-10-05

## 2023-07-10 RX ORDER — AMOXICILLIN AND CLAVULANATE POTASSIUM 875; 125 MG/1; MG/1
1 TABLET, FILM COATED ORAL EVERY 12 HOURS
Qty: 10 TABLET | Refills: 0 | Status: ACTIVE | OUTPATIENT
Start: 2023-07-10 | End: 2023-07-15

## 2023-07-10 RX ADMIN — SENNOSIDES AND DOCUSATE SODIUM 2 TABLET: 50; 8.6 TABLET ORAL at 04:56

## 2023-07-10 RX ADMIN — LOSARTAN POTASSIUM 100 MG: 50 TABLET, FILM COATED ORAL at 04:52

## 2023-07-10 RX ADMIN — TAMSULOSIN HYDROCHLORIDE 0.4 MG: 0.4 CAPSULE ORAL at 04:57

## 2023-07-10 RX ADMIN — INSULIN HUMAN 1 UNITS: 100 INJECTION, SOLUTION PARENTERAL at 09:32

## 2023-07-10 RX ADMIN — TRAMADOL HYDROCHLORIDE 50 MG: 50 TABLET ORAL at 04:56

## 2023-07-10 RX ADMIN — ACETAMINOPHEN 1000 MG: 500 TABLET, FILM COATED ORAL at 04:56

## 2023-07-10 RX ADMIN — AMLODIPINE BESYLATE 10 MG: 10 TABLET ORAL at 04:56

## 2023-07-10 RX ADMIN — DEXAMETHASONE SODIUM PHOSPHATE 4 MG: 4 INJECTION, SOLUTION INTRAMUSCULAR; INTRAVENOUS at 01:18

## 2023-07-10 RX ADMIN — APIXABAN 5 MG: 5 TABLET, FILM COATED ORAL at 04:57

## 2023-07-10 RX ADMIN — AMOXICILLIN AND CLAVULANATE POTASSIUM 1 TABLET: 875; 125 TABLET, FILM COATED ORAL at 04:57

## 2023-07-10 RX ADMIN — METOPROLOL TARTRATE 12.5 MG: 25 TABLET, FILM COATED ORAL at 04:56

## 2023-07-10 RX ADMIN — DEXAMETHASONE SODIUM PHOSPHATE 4 MG: 4 INJECTION, SOLUTION INTRAMUSCULAR; INTRAVENOUS at 09:28

## 2023-07-10 ASSESSMENT — GAIT ASSESSMENTS
GAIT LEVEL OF ASSIST: SUPERVISED
DISTANCE (FEET): 400

## 2023-07-10 ASSESSMENT — COGNITIVE AND FUNCTIONAL STATUS - GENERAL
WALKING IN HOSPITAL ROOM: A LITTLE
CLIMB 3 TO 5 STEPS WITH RAILING: A LITTLE
MOVING TO AND FROM BED TO CHAIR: A LITTLE
TURNING FROM BACK TO SIDE WHILE IN FLAT BAD: A LITTLE
MOBILITY SCORE: 18
SUGGESTED CMS G CODE MODIFIER MOBILITY: CK
STANDING UP FROM CHAIR USING ARMS: A LITTLE
MOVING FROM LYING ON BACK TO SITTING ON SIDE OF FLAT BED: A LITTLE

## 2023-07-10 NOTE — DISCHARGE SUMMARY
"Discharge Summary    CHIEF COMPLAINT ON ADMISSION  Chief Complaint   Patient presents with    Other     Patient sent CVMS for sore throat and feeling like he has something stuck in throat. Patient voice is \"more gravel\" sounding over the day. The patient reports pain and feeling like something is stuck x 4 days. Patient was also dx with afib yesterday and started on medications but has been unable to take them due to unable to swallow.        Reason for Admission  sent by MD     Admission Date  7/8/2023    CODE STATUS  Full Code    HPI & HOSPITAL COURSE  88-year-old male with a past medical history of recent diagnosis of atrial fibrillation on Eliquis, ulcerative colitis, hypertension, and hyperlipidemia was admitted on 7/8/2023 for oropharyngeal inflammation noted on CT imaging.  Patient was started on an IV steroid regimen.  He was evaluated by ENT who recommended continuation of steroids and initiation of oral antibiotics.  Patient was evaluated by speech pathology and was noted for mild oropharyngeal dysphagia for which his diet was adjusted.  Patient was started on metoprolol and continued rate control while on telemetry.  7/10/2023 echocardiogram with dilated left atrium, ejection fraction 50% and otherwise normal.  Follow-up brain MRI with no acute intracranial abnormalities.  Patient tolerating meals and ambulating around hallways without any difficulties.  Stable patient with in chronic condition was discharged home on a Medrol pack and an Augmentin regimen as well as Eliquis for atrial fibrillation anticoagulation and was instructed to follow-up with his primary care provider within 1 week.  All results and plan of action were discussed with the patient for which she voiced understanding and agreement with the primary care team.  Patient was instructed to return to the emergency department if symptoms were to worsen    Therefore, he is discharged in good and stable condition to home with close outpatient " follow-up.    The patient met 2-midnight criteria for an inpatient stay at the time of discharge.    Discharge Date  7/10/2023    FOLLOW UP ITEMS POST DISCHARGE  Primary care provider to follow posthospital discharge care and refer to cardiology for outpatient cardioversion indication    DISCHARGE DIAGNOSES  Principal Problem:    Dysphagia (POA: Yes)  Active Problems:    Hyperlipidemia (POA: Yes)    Colitis, ulcerative (HCC) (POA: Yes)    ACP (advance care planning) (POA: Unknown)    Essential hypertension (POA: Yes)    Atrial fibrillation with RVR (HCC) (POA: Unknown)    Hyperglycemia (POA: Unknown)    Dehydration (POA: Unknown)  Resolved Problems:    * No resolved hospital problems. *      FOLLOW UP  Future Appointments   Date Time Provider Department Center   7/11/2023 11:00 AM [facility] INF CHAIR 2 INF None   7/13/2023 11:30 AM Ike Alvarez M.D. [facility]   10/5/2023  9:15 AM Ike Alvarez M.D. [facility]   1/18/2024 10:00 AM Tao Gunn M.D. [facility] None     No follow-up provider specified.    MEDICATIONS ON DISCHARGE     Medication List        START taking these medications        Instructions   acetaminophen 500 MG Tabs  Commonly known as: Tylenol   Take 2 Tablets by mouth every 8 hours as needed for Mild Pain.  Dose: 1,000 mg     amoxicillin-clavulanate 875-125 MG Tabs  Commonly known as: Augmentin   Take 1 Tablet by mouth every 12 hours for 5 days.  Dose: 1 Tablet     guaiFENesin dextromethorphan 100-10 MG/5ML Syrp syrup  Commonly known as: Robitussin DM   Take 10 mL by mouth every 6 hours as needed for Cough.  Dose: 10 mL     methylPREDNISolone 4 MG Tbpk  Commonly known as: Medrol Dosepak   Follow schedule on package instructions.            CHANGE how you take these medications        Instructions   amLODIPine 10 MG Tabs  What changed:   how much to take  how to take this  when to take this  additional instructions  Commonly known as: Norvasc   TAKE ONE TABLET BY MOUTH DAILY INDICATIONS:  HIGH BLOOD PRESSURE            CONTINUE taking these medications        Instructions   apixaban 5mg Tabs  Commonly known as: Eliquis   Take 1 Tablet by mouth 2 times a day.  Dose: 5 mg     atorvastatin 10 MG Tabs  Commonly known as: Lipitor   TAKE ONE TABLET BY MOUTH DAILY     celecoxib 200 MG Caps  Commonly known as: CeleBREX   Take 200 mg by mouth every day.  Dose: 200 mg     fluticasone 50 MCG/ACT nasal spray  Commonly known as: Flonase   as needed.     losartan 100 MG Tabs  Commonly known as: Cozaar   TAKE ONE TABLET BY MOUTH DAILY     metoprolol tartrate 25 MG Tabs  Commonly known as: Lopressor   Take 0.5 Tablets by mouth 2 times a day.  Dose: 12.5 mg     tadalafil 10 MG tablet  Commonly known as: Cialis   Take 1 Tablet by mouth 1 time a day as needed for Erectile Dysfunction.  Dose: 10 mg     tamsulosin 0.4 MG capsule  Commonly known as: Flomax   Take 0.4 mg by mouth every day.  Dose: 0.4 mg     traMADol 50 MG Tabs  Commonly known as: Ultram   Take 100 mg by mouth 2 times a day. 100 MG = 2 TABS  Dose: 100 mg     Vitamin D3 50 MCG (2000 UT) Tabs   Take 8,000 Units by mouth 2 times a day. 8,000UNITS=4 TABS  Dose: 8,000 Units     ZINC PO   Take  by mouth.            STOP taking these medications      aspirin 325 MG Tabs  Commonly known as: Asa     CALCIUM PO     magnesium oxide 400 MG Tabs tablet  Commonly known as: Mag-Ox     PROBIOTIC PO              Allergies  No Known Allergies    DIET  Orders Placed This Encounter   Procedures    Diet Order Diet: Level 6 - Soft and Bite Sized; Liquid level: Level 0 - Thin     Standing Status:   Standing     Number of Occurrences:   1     Order Specific Question:   Diet:     Answer:   Level 6 - Soft and Bite Sized [23]     Order Specific Question:   Liquid level     Answer:   Level 0 - Thin       ACTIVITY  As tolerated.  Weight bearing as tolerated    CONSULTATIONS  ENT    PROCEDURES  None    LABORATORY  Lab Results   Component Value Date    SODIUM 137 07/10/2023    POTASSIUM  4.6 07/10/2023    CHLORIDE 104 07/10/2023    CO2 21 07/10/2023    GLUCOSE 144 (H) 07/10/2023    BUN 32 (H) 07/10/2023    CREATININE 0.77 07/10/2023        Lab Results   Component Value Date    WBC 11.0 (H) 07/10/2023    HEMOGLOBIN 13.6 (L) 07/10/2023    HEMATOCRIT 40.5 (L) 07/10/2023    PLATELETCT 230 07/10/2023        Total time of the discharge process exceeds 36 minutes.

## 2023-07-10 NOTE — PROGRESS NOTES
Assumed care of patient from day shift RN at 1855, Patient AOX4, VSS.   Fall education reinforced, call bell within reach, bed locked and in lowest position. POC discussed and all questions answered.  Purposeful and or hourly rounding in place.

## 2023-07-10 NOTE — THERAPY
Physical Therapy   Initial Evaluation     Patient Name: Abhilash Garcia  Age:  88 y.o., Sex:  male  Medical Record #: [id number]  Today's Date: 7/10/2023     Precautions  Precautions: Swallow Precautions    Assessment  Patient is 88 y.o. male admitted with difficulty swallowing, found to have odynophagia and dysphagia per ENT.  CT showed inflammatory changes of palatine & lingual tonsils, epiglottis, and uvula.  PMH includes A fib on Eliquis, ulcerative colitis, HTN, HLD, and back pain.  Patient was seen for PT evaluation, mobilized with SPV as detailed below.  Patient appears to be at/near functional baseline.  Educated on role of outpatient PT for back pain management & strengthening.  Briefly educated on exercises for core strengthening & management of back pain.  No further acute PT needs.     Plan    Physical Therapy Initial Treatment Plan   Duration: Evaluation only    DC Equipment Recommendations: None  Discharge Recommendations: Anticipate that the patient will have no further physical therapy needs after discharge from the hospital     Objective     07/10/23 0958   Precautions   Precautions Swallow Precautions   Pain 0 - 10 Group   Therapist Pain Assessment Post Activity Pain Same as Prior to Activity;Nurse Notified   Prior Living Situation   Prior Services Home-Independent   Housing / Facility 1 Story House   Steps Into Home 2   Equipment Owned None   Lives with - Patient's Self Care Capacity Alone and Able to Care For Self   Comments Pt lives alone with his dog, has family nearby if needed.   Prior Level of Functional Mobility   Bed Mobility Independent   Transfer Status Independent   Ambulation Independent   Ambulation Distance community ambulator   Assistive Devices Used None   Stairs Independent   Comments Pt walks his dog at least 1 mile a day.  Enjoys competition shooting & fishing.   Cognition    Cognition / Consciousness WDL   Comments Very pleasant & cooperative   Active ROM Lower Body    Active ROM  Lower Body  WDL   Strength Lower Body   Lower Body Strength  WDL   Sensation Lower Body   Lower Extremity Sensation   WDL   Comments Endorsed N/T at times due to hx of back issues   Other Treatments   Other Treatments Provided Educated pt on role of outpt PT to manage back pain.  Briefly educated on exercises for core strengthening & reducing back pain.   Balance Assessment   Sitting Balance (Static) Good   Sitting Balance (Dynamic) Good   Standing Balance (Static) Fair   Standing Balance (Dynamic) Fair   Weight Shift Sitting Good   Bed Mobility    Supine to Sit Supervised   Sit to Supine Supervised   Scooting Supervised   Gait Analysis   Gait Level Of Assist Supervised   Assistive Device None   Distance (Feet) 400   # of Times Distance was Traveled 1   Deviation (lateral deviations, no LOB)   # of Stairs Climbed 3   Level of Assist with Stairs Supervised   Weight Bearing Status No restrictions   Functional Mobility   Sit to Stand Supervised   Bed, Chair, Wheelchair Transfer Supervised   Transfer Method Stand Step   Mobility bed mobility, ambulation, stairs   Activity Tolerance   Sitting Edge of Bed 5 min   Standing 8 min   Education Group   Education Provided Role of Physical Therapist   Role of Physical Therapist Patient Response Patient;Acceptance;Explanation;Verbal Demonstration   Physical Therapy Initial Treatment Plan    Duration Evaluation only

## 2023-07-10 NOTE — CARE PLAN
Problem: Knowledge Deficit - Standard  Goal: Patient and family/care givers will demonstrate understanding of plan of care, disease process/condition, diagnostic tests and medications  Outcome: Progressing     Problem: Pain - Standard  Goal: Alleviation of pain or a reduction in pain to the patient’s comfort goal  Outcome: Progressing   The patient is Watcher - Medium risk of patient condition declining or worsening    Shift Goals  Clinical Goals: monitor labs, VS  Patient Goals: sleep    Progress made toward(s) clinical / shift goals:  Pt understands and can explain current POC. Pain is 0 out of 10 during this writer's shift.    Patient is not progressing towards the following goals:

## 2023-07-10 NOTE — PROGRESS NOTES
Discharge instructions provided to pt and relative. Discussed follow up appointments, diet, medications and activity. Pt states understanding. IV was removed. Copy of discharge provided to the patient. A signed copy of discharge is in patient's chart. All personal belongings are in patients possession. All questions have been answered. Patient is being wheeled off floor.

## 2023-07-11 LAB
BACTERIA UR CULT: NORMAL
SIGNIFICANT IND 70042: NORMAL
SITE SITE: NORMAL
SOURCE SOURCE: NORMAL

## 2024-04-27 NOTE — ASSESSMENT & PLAN NOTE
Amlodipine, metoprolol  
Discussed goal of care with patient and patient's son in ED.  Patient is agreeable for noninvasive and invasive/life-sustaining heroic measures- agreeable for CPR/defibrillation/intubation or mechanical ventilation.  He is also agreeable for further diagnostic and work-up for dysphagia, ENT evaluation.  Additionally, he is interested in further diagnostic work-up/treatment of new newly diagnosed atrial fibrillation.  Questions and concerns addressed to patient and patient's son in ED. ACP: 18 minutes  
ISS  
IVF  
No acute findings noted on CT chest and CT soft tissue [facility] to transfer  Decadron for now  No evidence of airway compromise at this time    Aspiration precautions  Speech eval  ENT consulted by ERP, follow-up appreciated  
Per history  Outpatient follow-up  
RVR resolved  New onset of A-fib diagnosed yesterday --started on Eliquis  Hold Eliquis for now until assurance of no further invasive intervention    Rate control: Metoprolol  -PRN IV BB    Maintain potassium above 4, magnesium above 2  Trend CE, EKG  Check echo  
Statin  
No